# Patient Record
Sex: MALE | Race: WHITE | HISPANIC OR LATINO
[De-identification: names, ages, dates, MRNs, and addresses within clinical notes are randomized per-mention and may not be internally consistent; named-entity substitution may affect disease eponyms.]

---

## 2022-05-18 ENCOUNTER — APPOINTMENT (OUTPATIENT)
Dept: GASTROENTEROLOGY | Facility: CLINIC | Age: 62
End: 2022-05-18

## 2022-07-27 ENCOUNTER — NON-APPOINTMENT (OUTPATIENT)
Age: 62
End: 2022-07-27

## 2022-07-28 ENCOUNTER — RESULT CHARGE (OUTPATIENT)
Age: 62
End: 2022-07-28

## 2022-07-28 ENCOUNTER — APPOINTMENT (OUTPATIENT)
Dept: INTERNAL MEDICINE | Facility: CLINIC | Age: 62
End: 2022-07-28

## 2022-07-28 VITALS
HEIGHT: 67 IN | BODY MASS INDEX: 31.23 KG/M2 | WEIGHT: 199 LBS | HEART RATE: 51 BPM | TEMPERATURE: 98 F | SYSTOLIC BLOOD PRESSURE: 171 MMHG | OXYGEN SATURATION: 93 % | RESPIRATION RATE: 14 BRPM | DIASTOLIC BLOOD PRESSURE: 95 MMHG

## 2022-07-28 VITALS — SYSTOLIC BLOOD PRESSURE: 160 MMHG | DIASTOLIC BLOOD PRESSURE: 80 MMHG

## 2022-07-28 DIAGNOSIS — Z83.3 FAMILY HISTORY OF DIABETES MELLITUS: ICD-10-CM

## 2022-07-28 DIAGNOSIS — Z80.3 FAMILY HISTORY OF MALIGNANT NEOPLASM OF BREAST: ICD-10-CM

## 2022-07-28 DIAGNOSIS — R10.9 UNSPECIFIED ABDOMINAL PAIN: ICD-10-CM

## 2022-07-28 DIAGNOSIS — R39.15 URGENCY OF URINATION: ICD-10-CM

## 2022-07-28 DIAGNOSIS — Z80.0 FAMILY HISTORY OF MALIGNANT NEOPLASM OF DIGESTIVE ORGANS: ICD-10-CM

## 2022-07-28 DIAGNOSIS — S43.005A UNSPECIFIED DISLOCATION OF LEFT SHOULDER JOINT, INITIAL ENCOUNTER: ICD-10-CM

## 2022-07-28 DIAGNOSIS — Z82.49 FAMILY HISTORY OF ISCHEMIC HEART DISEASE AND OTHER DISEASES OF THE CIRCULATORY SYSTEM: ICD-10-CM

## 2022-07-28 DIAGNOSIS — Z78.9 OTHER SPECIFIED HEALTH STATUS: ICD-10-CM

## 2022-07-28 DIAGNOSIS — K57.90 DIVERTICULOSIS OF INTESTINE, PART UNSPECIFIED, W/OUT PERFORATION OR ABSCESS W/OUT BLEEDING: ICD-10-CM

## 2022-07-28 DIAGNOSIS — Z80.8 FAMILY HISTORY OF MALIGNANT NEOPLASM OF OTHER ORGANS OR SYSTEMS: ICD-10-CM

## 2022-07-28 DIAGNOSIS — Z87.39 PERSONAL HISTORY OF OTHER DISEASES OF THE MUSCULOSKELETAL SYSTEM AND CONNECTIVE TISSUE: ICD-10-CM

## 2022-07-28 LAB
BILIRUB UR QL STRIP: NEGATIVE
CLARITY UR: CLEAR
COLLECTION METHOD: NORMAL
GLUCOSE UR-MCNC: NEGATIVE
HCG UR QL: 0.2 EU/DL
HGB UR QL STRIP.AUTO: NORMAL
KETONES UR-MCNC: NEGATIVE
LEUKOCYTE ESTERASE UR QL STRIP: NEGATIVE
NITRITE UR QL STRIP: NEGATIVE
PH UR STRIP: 5.5
PROT UR STRIP-MCNC: NEGATIVE
SP GR UR STRIP: >1.03

## 2022-07-28 PROCEDURE — 99204 OFFICE O/P NEW MOD 45 MIN: CPT | Mod: GC,25

## 2022-07-28 PROCEDURE — 99386 PREV VISIT NEW AGE 40-64: CPT | Mod: 25

## 2022-07-28 PROCEDURE — 81002 URINALYSIS NONAUTO W/O SCOPE: CPT

## 2022-07-28 PROCEDURE — 36415 COLL VENOUS BLD VENIPUNCTURE: CPT

## 2022-07-28 PROCEDURE — G0444 DEPRESSION SCREEN ANNUAL: CPT | Mod: 59

## 2022-07-28 RX ORDER — BISOPROLOL FUMARATE AND HYDROCHLOROTHIAZIDE 5; 6.25 MG/1; MG/1
5-6.25 TABLET, FILM COATED ORAL DAILY
Refills: 0 | Status: DISCONTINUED | COMMUNITY
Start: 2022-07-28 | End: 2022-07-28

## 2022-07-28 RX ORDER — CHROMIUM 200 MCG
400 TABLET ORAL
Refills: 0 | Status: ACTIVE | COMMUNITY

## 2022-07-28 RX ORDER — ASCORBIC ACID 1000 MG
1000 TABLET, EXTENDED RELEASE ORAL
Refills: 0 | Status: ACTIVE | COMMUNITY

## 2022-07-29 ENCOUNTER — NON-APPOINTMENT (OUTPATIENT)
Age: 62
End: 2022-07-29

## 2022-07-29 LAB
ANION GAP SERPL CALC-SCNC: 15 MMOL/L
BUN SERPL-MCNC: 14 MG/DL
CALCIUM SERPL-MCNC: 10.2 MG/DL
CHLORIDE SERPL-SCNC: 105 MMOL/L
CHOLEST SERPL-MCNC: 244 MG/DL
CO2 SERPL-SCNC: 24 MMOL/L
CREAT SERPL-MCNC: 0.93 MG/DL
EGFR: 93 ML/MIN/1.73M2
GLUCOSE SERPL-MCNC: 114 MG/DL
HDLC SERPL-MCNC: 54 MG/DL
LDLC SERPL CALC-MCNC: 160 MG/DL
NONHDLC SERPL-MCNC: 190 MG/DL
POTASSIUM SERPL-SCNC: 4.4 MMOL/L
PSA SERPL-MCNC: 1.63 NG/ML
SODIUM SERPL-SCNC: 143 MMOL/L
TRIGL SERPL-MCNC: 150 MG/DL

## 2022-07-29 NOTE — ASSESSMENT
[FreeTextEntry1] : 60 y/o M with PMH HTN, diverticulosis, B/L rotator cuff tears s/p L shoulder arthroscopy (2018), umbilical hernia s/p repair presenting for annual physical with complaints of LLQ abdominal discomfort and urinary frequency.

## 2022-07-29 NOTE — PHYSICAL EXAM
[JVP Elevated ___cm] : the JVP was elevated [unfilled] ~Ucm [Distended] : distended [Scar] : a scar was noted [Epigastric] : in the epigastric area [LLQ] : in the left lower quadrant [No Mass] : no masses were palpated [No HSM] : no hepatosplenomegaly noted [None] : no CVA tenderness [Normal] : no rash [Periumbilical] : not periumbilical [Suprapubic] : not in the suprapubic area [RUQ] : not in the RUQ [RLQ] : not in the RLQ [LUQ] : not in the LUQ [Firm] : not firm [Rigid] : not rigid [Rebound] : no rebound [Guarding] : no guarding

## 2022-07-29 NOTE — REVIEW OF SYSTEMS
[Abdominal Pain] : abdominal pain [Frequency] : frequency [Negative] : Neurological [Nausea] : no nausea [Constipation] : no constipation [Diarrhea] : no diarrhea [Vomiting] : no vomiting [Heartburn] : no heartburn [Melena] : no melena [Dysuria] : no dysuria [Incontinence] : no incontinence [Hesitancy] : no hesitancy [Nocturia] : no nocturia [Hematuria] : no hematuria [Impotence] : no impotency [Poor Libido] : libido not poor

## 2022-07-29 NOTE — END OF VISIT
[] : Resident [FreeTextEntry3] : HTN: Repeat BP - 160/80.  DC BB+diuretic combo.  Start ACEi and Add terazosin. \par \par Urinary frequency - sx c/w BPH.  Check PSA.  DC diuretic. Can start a trial of terazosin for HTN and BPH. \par \par Chronic intermittent LLQ discomofort .  H/o abd surgery. No ssx obstruction.  Cscope normal recently.  GI referral.

## 2022-07-29 NOTE — HEALTH RISK ASSESSMENT
[Good] : ~his/her~  mood as  good [Never] : Never [Yes] : Yes [Monthly or less (1 pt)] : Monthly or less (1 point) [1 or 2 (0 pts)] : 1 or 2 (0 points) [Never (0 pts)] : Never (0 points) [No falls in past year] : Patient reported no falls in the past year [Patient refused screening] : Patient refused screening [Patient reported colonoscopy was abnormal] : Patient reported colonoscopy was abnormal [None] : None [With Significant Other] : lives with significant other [] :  [Sexually Active] : sexually active [Fully functional (bathing, dressing, toileting, transferring, walking, feeding)] : Fully functional (bathing, dressing, toileting, transferring, walking, feeding) [Fully functional (using the telephone, shopping, preparing meals, housekeeping, doing laundry, using] : Fully functional and needs no help or supervision to perform IADLs (using the telephone, shopping, preparing meals, housekeeping, doing laundry, using transportation, managing medications and managing finances) [0] : 2) Feeling down, depressed, or hopeless: Not at all (0) [PHQ-2 Negative - No further assessment needed] : PHQ-2 Negative - No further assessment needed [FreeTextEntry1] : frequent urination [de-identified] : active  [de-identified] : includes all food groups [[id number]] : 0 [High Risk Behavior] : no high risk behavior [Reports changes in hearing] : Reports no changes in hearing [Reports changes in vision] : Reports no changes in vision [Reports changes in dental health] : Reports no changes in dental health [ColonoscopyDate] : 04/2021 [ColonoscopyComments] : diverticulosis, hemorrhoid, and 1 polyp, return in 5 years

## 2022-07-29 NOTE — HISTORY OF PRESENT ILLNESS
[FreeTextEntry1] : Annual physical [de-identified] : 60 y/o M with PMH HTN, diverticulosis, B/L rotator cuff tears s/p L shoulder arthroscopy (2018), umbilical hernia s/p repair presenting for annual physical with complaints of LLQ abdominal discomfort for 4 days without associated N/V, heart burn, diarrhea. Pt reports intermittent bloating and gas for which he frequently takes pepcid for symptomatic relief. \par Pt also reports urinary frequency for several months in which he states that he gets the sudden urge to urinate and after urinating, has to urinate every 10-20 minutes. He denies any dysuria or hematuria, reports a normal stream and denies hesitance. He states that he awakes once a night to urinate

## 2022-07-29 NOTE — PLAN
[FreeTextEntry1] : #Abdominal discomfort\par Pt has a hx of diverticulosis, does not present with N/V, fever, chills, diarrhea so less concern for diverticulitis. Pain is improved with defecation, pt reports continued flatulence as well. Pt endorses pepcid as needed for dyspepsa, abdominal discomfort with improvement.\par - GI referral\par - Cont symptomatic management\par \par #Urinary frequency\par Pt denies hematuria, dysuria but complains of urinary frequency without nocturia and with normal stream without hesitancy.\par - F/U PSA\par - U/A negative\par - Start trial of terazosin 1mg and uptitrated to 2mg after 4 weeks, follow up in 6 weeks\par \par #HTN\par Pt has hx of HTN on bisoprolol-HCTZ. /91 and HR 51 on exam and 160/80 on repeat\par - D/C bisoprolol-HCTZ\par - Started lisinopril 10mg \par - F/U BMP\par - F/U in 6 weeks for BP check\par \par #HCM\par - COVID UTD\par - Dental and vision due\par - Colonoscopy due in 2026\par - Deferring additional vaccines at this time \par

## 2022-07-31 ENCOUNTER — EMERGENCY (EMERGENCY)
Facility: HOSPITAL | Age: 62
LOS: 1 days | Discharge: HOME | End: 2022-07-31
Attending: EMERGENCY MEDICINE | Admitting: EMERGENCY MEDICINE
Payer: MEDICAID

## 2022-07-31 ENCOUNTER — TRANSCRIPTION ENCOUNTER (OUTPATIENT)
Age: 62
End: 2022-07-31

## 2022-07-31 VITALS — DIASTOLIC BLOOD PRESSURE: 96 MMHG | HEART RATE: 62 BPM | SYSTOLIC BLOOD PRESSURE: 202 MMHG

## 2022-07-31 VITALS
TEMPERATURE: 98 F | WEIGHT: 195.11 LBS | OXYGEN SATURATION: 97 % | HEART RATE: 60 BPM | SYSTOLIC BLOOD PRESSURE: 232 MMHG | DIASTOLIC BLOOD PRESSURE: 123 MMHG | RESPIRATION RATE: 18 BRPM

## 2022-07-31 LAB
ALBUMIN SERPL ELPH-MCNC: 5 G/DL — SIGNIFICANT CHANGE UP (ref 3.3–5)
ALP SERPL-CCNC: 72 U/L — SIGNIFICANT CHANGE UP (ref 40–120)
ALT FLD-CCNC: 30 U/L — SIGNIFICANT CHANGE UP (ref 10–45)
ANION GAP SERPL CALC-SCNC: 9 MMOL/L — SIGNIFICANT CHANGE UP (ref 5–17)
APTT BLD: 32.6 SEC — SIGNIFICANT CHANGE UP (ref 27.5–35.5)
AST SERPL-CCNC: 22 U/L — SIGNIFICANT CHANGE UP (ref 10–40)
BASOPHILS # BLD AUTO: 0.03 K/UL — SIGNIFICANT CHANGE UP (ref 0–0.2)
BASOPHILS NFR BLD AUTO: 0.5 % — SIGNIFICANT CHANGE UP (ref 0–2)
BILIRUB SERPL-MCNC: 0.4 MG/DL — SIGNIFICANT CHANGE UP (ref 0.2–1.2)
BUN SERPL-MCNC: 15 MG/DL — SIGNIFICANT CHANGE UP (ref 7–23)
CALCIUM SERPL-MCNC: 9.7 MG/DL — SIGNIFICANT CHANGE UP (ref 8.4–10.5)
CHLORIDE SERPL-SCNC: 105 MMOL/L — SIGNIFICANT CHANGE UP (ref 96–108)
CO2 SERPL-SCNC: 27 MMOL/L — SIGNIFICANT CHANGE UP (ref 22–31)
CREAT SERPL-MCNC: 0.86 MG/DL — SIGNIFICANT CHANGE UP (ref 0.5–1.3)
EGFR: 99 ML/MIN/1.73M2 — SIGNIFICANT CHANGE UP
EOSINOPHIL # BLD AUTO: 0.08 K/UL — SIGNIFICANT CHANGE UP (ref 0–0.5)
EOSINOPHIL NFR BLD AUTO: 1.3 % — SIGNIFICANT CHANGE UP (ref 0–6)
GLUCOSE SERPL-MCNC: 113 MG/DL — HIGH (ref 70–99)
HCT VFR BLD CALC: 44.5 % — SIGNIFICANT CHANGE UP (ref 39–50)
HGB BLD-MCNC: 15.5 G/DL — SIGNIFICANT CHANGE UP (ref 13–17)
IMM GRANULOCYTES NFR BLD AUTO: 0.5 % — SIGNIFICANT CHANGE UP (ref 0–1.5)
INR BLD: 0.97 — SIGNIFICANT CHANGE UP (ref 0.88–1.16)
LYMPHOCYTES # BLD AUTO: 1.69 K/UL — SIGNIFICANT CHANGE UP (ref 1–3.3)
LYMPHOCYTES # BLD AUTO: 28 % — SIGNIFICANT CHANGE UP (ref 13–44)
MCHC RBC-ENTMCNC: 29.5 PG — SIGNIFICANT CHANGE UP (ref 27–34)
MCHC RBC-ENTMCNC: 34.8 GM/DL — SIGNIFICANT CHANGE UP (ref 32–36)
MCV RBC AUTO: 84.6 FL — SIGNIFICANT CHANGE UP (ref 80–100)
MONOCYTES # BLD AUTO: 0.35 K/UL — SIGNIFICANT CHANGE UP (ref 0–0.9)
MONOCYTES NFR BLD AUTO: 5.8 % — SIGNIFICANT CHANGE UP (ref 2–14)
NEUTROPHILS # BLD AUTO: 3.85 K/UL — SIGNIFICANT CHANGE UP (ref 1.8–7.4)
NEUTROPHILS NFR BLD AUTO: 63.9 % — SIGNIFICANT CHANGE UP (ref 43–77)
NRBC # BLD: 0 /100 WBCS — SIGNIFICANT CHANGE UP (ref 0–0)
PLATELET # BLD AUTO: 215 K/UL — SIGNIFICANT CHANGE UP (ref 150–400)
POTASSIUM SERPL-MCNC: 4 MMOL/L — SIGNIFICANT CHANGE UP (ref 3.5–5.3)
POTASSIUM SERPL-SCNC: 4 MMOL/L — SIGNIFICANT CHANGE UP (ref 3.5–5.3)
PROT SERPL-MCNC: 7 G/DL — SIGNIFICANT CHANGE UP (ref 6–8.3)
PROTHROM AB SERPL-ACNC: 11.5 SEC — SIGNIFICANT CHANGE UP (ref 10.5–13.4)
RBC # BLD: 5.26 M/UL — SIGNIFICANT CHANGE UP (ref 4.2–5.8)
RBC # FLD: 12.4 % — SIGNIFICANT CHANGE UP (ref 10.3–14.5)
SARS-COV-2 RNA SPEC QL NAA+PROBE: NEGATIVE — SIGNIFICANT CHANGE UP
SODIUM SERPL-SCNC: 141 MMOL/L — SIGNIFICANT CHANGE UP (ref 135–145)
WBC # BLD: 6.03 K/UL — SIGNIFICANT CHANGE UP (ref 3.8–10.5)
WBC # FLD AUTO: 6.03 K/UL — SIGNIFICANT CHANGE UP (ref 3.8–10.5)

## 2022-07-31 PROCEDURE — 85610 PROTHROMBIN TIME: CPT

## 2022-07-31 PROCEDURE — 87635 SARS-COV-2 COVID-19 AMP PRB: CPT

## 2022-07-31 PROCEDURE — 80053 COMPREHEN METABOLIC PANEL: CPT

## 2022-07-31 PROCEDURE — 70450 CT HEAD/BRAIN W/O DYE: CPT | Mod: 26,MA

## 2022-07-31 PROCEDURE — 96374 THER/PROPH/DIAG INJ IV PUSH: CPT

## 2022-07-31 PROCEDURE — 99285 EMERGENCY DEPT VISIT HI MDM: CPT | Mod: 25

## 2022-07-31 PROCEDURE — 85730 THROMBOPLASTIN TIME PARTIAL: CPT

## 2022-07-31 PROCEDURE — 70450 CT HEAD/BRAIN W/O DYE: CPT | Mod: MA

## 2022-07-31 PROCEDURE — 85025 COMPLETE CBC W/AUTO DIFF WBC: CPT

## 2022-07-31 PROCEDURE — 82962 GLUCOSE BLOOD TEST: CPT

## 2022-07-31 PROCEDURE — 96375 TX/PRO/DX INJ NEW DRUG ADDON: CPT

## 2022-07-31 PROCEDURE — 99285 EMERGENCY DEPT VISIT HI MDM: CPT

## 2022-07-31 PROCEDURE — 93005 ELECTROCARDIOGRAM TRACING: CPT

## 2022-07-31 PROCEDURE — 36415 COLL VENOUS BLD VENIPUNCTURE: CPT

## 2022-07-31 RX ORDER — METOCLOPRAMIDE HCL 10 MG
10 TABLET ORAL ONCE
Refills: 0 | Status: COMPLETED | OUTPATIENT
Start: 2022-07-31 | End: 2022-07-31

## 2022-07-31 RX ORDER — ACETAMINOPHEN 500 MG
650 TABLET ORAL ONCE
Refills: 0 | Status: COMPLETED | OUTPATIENT
Start: 2022-07-31 | End: 2022-07-31

## 2022-07-31 RX ORDER — KETOROLAC TROMETHAMINE 30 MG/ML
15 SYRINGE (ML) INJECTION ONCE
Refills: 0 | Status: DISCONTINUED | OUTPATIENT
Start: 2022-07-31 | End: 2022-07-31

## 2022-07-31 RX ORDER — SODIUM CHLORIDE 9 MG/ML
1000 INJECTION INTRAMUSCULAR; INTRAVENOUS; SUBCUTANEOUS ONCE
Refills: 0 | Status: COMPLETED | OUTPATIENT
Start: 2022-07-31 | End: 2022-07-31

## 2022-07-31 RX ORDER — LISINOPRIL 2.5 MG/1
5 TABLET ORAL ONCE
Refills: 0 | Status: COMPLETED | OUTPATIENT
Start: 2022-07-31 | End: 2022-07-31

## 2022-07-31 RX ADMIN — Medication 650 MILLIGRAM(S): at 18:17

## 2022-07-31 RX ADMIN — Medication 650 MILLIGRAM(S): at 17:43

## 2022-07-31 RX ADMIN — Medication 15 MILLIGRAM(S): at 18:18

## 2022-07-31 RX ADMIN — Medication 104 MILLIGRAM(S): at 17:43

## 2022-07-31 RX ADMIN — SODIUM CHLORIDE 1000 MILLILITER(S): 9 INJECTION INTRAMUSCULAR; INTRAVENOUS; SUBCUTANEOUS at 17:44

## 2022-07-31 RX ADMIN — LISINOPRIL 5 MILLIGRAM(S): 2.5 TABLET ORAL at 17:55

## 2022-07-31 RX ADMIN — Medication 15 MILLIGRAM(S): at 18:09

## 2022-07-31 NOTE — ED PROVIDER NOTE - PROGRESS NOTE DETAILS
[place]: Labs grossly wnl. COVID neg. CTH w/ no acute pathology. Vitals improved  -still hypertensive ~200/90s but ~20% reduction from initial 250/120s. Pt is completely asymptomatic. Pt has asymptoamtic HTN. d/w pt that normal CTH does not r/o ICU but that clinically there is low suspicion. I d/w pt and wife/child at bedside risks/benefits of LP, pt agrees that risks likely outweigh benefits at this tie. Clinically asymptomatic HTN. will dc on both lisinopril and bisoprolol.   Discussed importance of outpt follow up and return precautions. Clinically no indication for further emergent ED workup or hospitalization at this time. Stable for dc, outpt f/u.

## 2022-07-31 NOTE — ED PROVIDER NOTE - PHYSICAL EXAMINATION
no nuchal rigidity  PERRL, EOMI, no nystagmus. CN intact. Strength 5/5. Normal F-->N. Steady unassisted gait. No pronator drift. Sensation intact. Normal speech, no dysarthria. No carotid bruits. Negative Romberg, normal tandem gait.

## 2022-07-31 NOTE — ED PROVIDER NOTE - NSFOLLOWUPINSTRUCTIONS_ED_ALL_ED_FT
Can take tylenol 650mg every 6hrs as needed for mild pain.    Stay well hydrated.    Return to ER for fevers, persistent vomit, chest pain, worsening headache, worsening confusion, uncontrolled pain, worsening breathing, worsening lightheaded, focal weakness/numbness, worsening vision changes.    Follow up with primary doctor within 1-2 days.     Follow up with neurologist. Can call [phone] to schedule appointment.     Measure your blood pressure once or twice a day.   Continue to take medications as prescribed. Your doctor may need to make medication changes if your blood pressure continues to be high. Take your usual bisoprolol/HCTZ in the mornings, take lisinopril 5mg at night.     Hypertension    Hypertension, commonly called high blood pressure, is when the force of blood pumping through your arteries is too strong. Hypertension forces your heart to work harder to pump blood. Your arteries may become narrow or stiff. Having untreated or uncontrolled hypertension for a long period of time can cause heart attack, stroke, kidney disease, and other problems. If started on a medication, take exactly as prescribed by your health care professional. Maintain a healthy lifestyle and follow up with your primary care physician.    SEEK IMMEDIATE MEDICAL CARE IF YOU HAVE ANY OF THE FOLLOWING SYMPTOMS: severe headache, confusion, chest pain, abdominal pain, vomiting, or shortness of breath.     A headache is pain or discomfort felt around the head or neck area. The specific cause of a headache may not be found. There are many causes and types of headaches. A few common ones are:  Tension headaches.  Migraine headaches.  Cluster headaches.  Chronic daily headaches.  Follow these instructions at home:  Watch your condition for any changes.     Take these steps to help with your condition:  Managing pain   Take over-the-counter and prescription medicines only as told by your health care provider.  Lie down in a dark, quiet room when you have a headache.  If directed, apply ice to the head and neck area:  Put ice in a plastic bag.  Place a towel between your skin and the bag.  Leave the ice on for 20 minutes, 2–3 times per day.  Use a heating pad or hot shower to apply heat to the head and neck area as told by your health care provider.  Keep lights dim if bright lights bother you or make your headaches worse.    Eating and drinking   Eat meals on a regular schedule.  Limit alcohol use.  Decrease the amount of caffeine you drink, or stop drinking caffeine.    General instructions      Keep all follow-up visits as told by your health care provider. This is important.  Keep a headache journal to help find out what may trigger your headaches. For example, write down:  What you eat and drink.  How much sleep you get.  Any change to your diet or medicines.  Try massage or other relaxation techniques.  Limit stress.  Sit up straight, and do not tense your muscles.  Do not use tobacco products, including cigarettes, chewing tobacco, or e-cigarettes. If you need help quitting, ask your health care provider.  Exercise regularly as told by your health care provider.  Sleep on a regular schedule. Get 7–9 hours of sleep, or the amount recommended by your health care provider.    Contact a health care provider if:  Your symptoms are not helped by medicine.  You have a headache that is different from the usual headache.  You have nausea or you vomit.  You have a fever.  Get help right away if:  Your headache becomes severe.  You have repeated vomiting.  You have a stiff neck.  You have a loss of vision.  You have problems with speech.  You have pain in the eye or ear.  You have muscular weakness or loss of muscle control.  You lose your balance or have trouble walking.  You feel faint or pass out.  You have confusion.

## 2022-07-31 NOTE — ED ADULT TRIAGE NOTE - CHIEF COMPLAINT QUOTE
Pt c/o h/a and HTN x one day. Endorses hx of HTN, compliant with medications. Pt denies numbness/tingling, unilateral weakness.

## 2022-07-31 NOTE — ED PROVIDER NOTE - PATIENT PORTAL LINK FT
You can access the FollowMyHealth Patient Portal offered by [facility] by registering at the following website: [email or web address]. By joining [employer]’s FollowMyHealth portal, you will also be able to view your health information using other applications (apps) compatible with our system.

## 2022-07-31 NOTE — ED ADULT NURSE NOTE - OBJECTIVE STATEMENT
Pt presented to er with c/o ha since yesterday. P endorses temporal headache that radiated to neck region. Pt also endorses high blood pressure n 200s. Pt upgraded to . Pt denies cp, sob, no numbness/tingling. Pt A&Ox4, ambulatory with steady gait, speaking in clear/full sentences, no acute distress, vital signs stable.

## 2022-07-31 NOTE — ED PROVIDER NOTE - OBJECTIVE STATEMENT
61M PMH HTN, HLD, p/w HTN. Pt states that he went to PMD 2d ago and was noted to be hypertensive 170s/100s. Pt used to take bisoprolol/HCTZ (10/6.5) once daily. PMD stopped that and started lisinopril 10mg which he took that day. However, BP decreased to 130s and pt was feeling slightly lightheaded and so pt states that PMD recommended decreasing to 5mg which he took yesterday. Today was still very hypertensive (180s-190s) and so he did not take lisinopril and instead took his bisoprolol/HCTZ. Remained hypertensive and so came to ED. Pt also notes bitemporal HA as well as pain to b/l posterior neck - gradual onset since yesterday, similar location to multiple prior but different quality (feels "pressure" like). No other systemic symptoms.   Drinks etoh on weekends, denies urge to drink etoh during the week or other withdrawal symptoms.   Denies vision changes, focal weakness/numbness, vertigo, rashes, tinnitus, hearing changes, URI symptoms, f/c, SOB/CP, NVD, abd pain, urinary complaints, black/bloody stool, lifestyle/dietary changes.

## 2022-07-31 NOTE — ED PROVIDER NOTE - CLINICAL SUMMARY MEDICAL DECISION MAKING FREE TEXT BOX
61M PMH HTN, HLD, p/w HTN. Pt states that he went to PMD 2d ago and was noted to be hypertensive 170s/100s. Pt used to take bisoprolol/HCTZ (10/6.5) once daily. PMD stopped that and started lisinopril 10mg which he took that day. However, BP decreased to 130s and pt was feeling slightly lightheaded and so pt states that PMD recommended decreasing to 5mg which he took yesterday. Today was still very hypertensive (180s-190s) and so he did not take lisinopril and instead took his bisoprolol/HCTZ. Remained hypertensive and so came to ED. Pt also notes bitemporal HA as well as pain to b/l posterior neck - gradual onset since yesterday, similar location to multiple prior but different quality (feels "pressure" like). No other systemic symptoms.   Hypertensive, other vitals wnl. Exam as above.  ddx: HTN likely 2/2 HA as well as medication adjustments on top of baseline HTN. Low suspicion for ICH/end organ manifestation of HTN.   CTH, IVF/symptom control, reassess.

## 2022-08-03 ENCOUNTER — APPOINTMENT (OUTPATIENT)
Dept: INTERNAL MEDICINE | Facility: CLINIC | Age: 62
End: 2022-08-03

## 2022-08-03 VITALS
HEIGHT: 67 IN | WEIGHT: 190 LBS | SYSTOLIC BLOOD PRESSURE: 175 MMHG | HEART RATE: 54 BPM | DIASTOLIC BLOOD PRESSURE: 95 MMHG | TEMPERATURE: 98.2 F | OXYGEN SATURATION: 99 % | BODY MASS INDEX: 29.82 KG/M2

## 2022-08-03 DIAGNOSIS — Z20.822 CONTACT WITH AND (SUSPECTED) EXPOSURE TO COVID-19: ICD-10-CM

## 2022-08-03 DIAGNOSIS — I10 ESSENTIAL (PRIMARY) HYPERTENSION: ICD-10-CM

## 2022-08-03 DIAGNOSIS — R42 DIZZINESS AND GIDDINESS: ICD-10-CM

## 2022-08-03 DIAGNOSIS — R51.9 HEADACHE, UNSPECIFIED: ICD-10-CM

## 2022-08-03 DIAGNOSIS — R00.1 BRADYCARDIA, UNSPECIFIED: ICD-10-CM

## 2022-08-03 DIAGNOSIS — E78.5 HYPERLIPIDEMIA, UNSPECIFIED: ICD-10-CM

## 2022-08-03 PROCEDURE — 99213 OFFICE O/P EST LOW 20 MIN: CPT | Mod: GC

## 2022-08-08 ENCOUNTER — APPOINTMENT (OUTPATIENT)
Dept: INTERNAL MEDICINE | Facility: CLINIC | Age: 62
End: 2022-08-08

## 2022-08-17 ENCOUNTER — APPOINTMENT (OUTPATIENT)
Dept: INTERNAL MEDICINE | Facility: CLINIC | Age: 62
End: 2022-08-17

## 2022-08-17 VITALS
WEIGHT: 187 LBS | HEART RATE: 70 BPM | OXYGEN SATURATION: 99 % | RESPIRATION RATE: 14 BRPM | DIASTOLIC BLOOD PRESSURE: 99 MMHG | HEIGHT: 67 IN | BODY MASS INDEX: 29.35 KG/M2 | SYSTOLIC BLOOD PRESSURE: 168 MMHG

## 2022-08-17 DIAGNOSIS — Z01.00 ENCOUNTER FOR EXAMINATION OF EYES AND VISION W/OUT ABNORMAL FINDINGS: ICD-10-CM

## 2022-08-17 PROCEDURE — 99213 OFFICE O/P EST LOW 20 MIN: CPT | Mod: GC

## 2022-08-17 RX ORDER — HYDROCHLOROTHIAZIDE 12.5 MG/1
12.5 CAPSULE ORAL DAILY
Qty: 30 | Refills: 0 | Status: DISCONTINUED | COMMUNITY
Start: 2022-08-03 | End: 2022-08-17

## 2022-08-17 RX ORDER — LISINOPRIL 10 MG/1
10 TABLET ORAL DAILY
Qty: 1 | Refills: 1 | Status: DISCONTINUED | COMMUNITY
Start: 2022-07-28 | End: 2022-08-17

## 2022-08-17 RX ORDER — TERAZOSIN 1 MG/1
1 CAPSULE ORAL
Qty: 30 | Refills: 1 | Status: DISCONTINUED | COMMUNITY
Start: 2022-07-28 | End: 2022-08-17

## 2022-08-17 NOTE — ASSESSMENT
[FreeTextEntry1] : 60 y/o man with PMH HTN, HLD, diverticulosis, B/L rotator cuff tears s/p L shoulder arthroscopy (2018), umbilical hernia s/p repair presenting for blood pressure medication optimization follow up. \par \par #HTN \par BP still elevated 150s-160s, even at home. \par - Increase HCTZ to 25mg once a day. Patient prefers to wait and see new BP before adding another medication (amlodipine) \par - F/u in 2 weeks for BP check and BMP \par \par #HLD\par LDL >150 on lipid profile from July. Not on statin. \par - Patient actively trying to lose weight and diet, would prefer to recheck lipids later and start medication at that time if it does not improve \par \par #HCM \par - UTD on COVId including booster - 2nd booster due in fall \par - Deferring pneumonia, shingles, etc at this time \par - Can check A1C at next visit with labs \par \par RTC in 2 weeks for BP check and BMP

## 2022-08-17 NOTE — END OF VISIT
[] : Resident [FreeTextEntry3] :  61 year old M presenting for BP f/u , accompanied by his wife. Tolerating HCTZ 12.5mg well, BP remains elevated. Will increase HCTZ to 25mg, pt declines additional BP agent for now, is pursuing healthy diet and exercise. Discussed elevated LDL, ASCVD risk and recommended statin which he also defers for now, will readdress at a future visit.  RTC 2 weeks for BP check and labs.

## 2022-08-17 NOTE — HISTORY OF PRESENT ILLNESS
[Spouse] : spouse [FreeTextEntry1] : here for blood pressure check  [de-identified] : 60 y/o man with PMH HTN, diverticulosis, B/L rotator cuff tears s/p L shoulder arthroscopy (2018), umbilical hernia s/p repair presenting for blood pressure medication optimization follow up. He was last seen 2 weeks prior, where we stopped his lisinopril and started HCTZ 12.5 once a day. Blood pressure log at home as been 150s-160s/90s. He denies any blurry vision, chest pain, dizziness, excessive urination. He is actively trying to lose weight (has lost 15 lbs intentionally over the last 1.5 months). He feels well otherwise and has no acute complaints. \par

## 2022-08-23 ENCOUNTER — RX RENEWAL (OUTPATIENT)
Age: 62
End: 2022-08-23

## 2022-08-26 ENCOUNTER — NON-APPOINTMENT (OUTPATIENT)
Age: 62
End: 2022-08-26

## 2022-09-08 ENCOUNTER — APPOINTMENT (OUTPATIENT)
Dept: INTERNAL MEDICINE | Facility: CLINIC | Age: 62
End: 2022-09-08

## 2022-09-08 VITALS
HEIGHT: 67 IN | SYSTOLIC BLOOD PRESSURE: 144 MMHG | DIASTOLIC BLOOD PRESSURE: 88 MMHG | TEMPERATURE: 97.7 F | WEIGHT: 187 LBS | HEART RATE: 60 BPM | OXYGEN SATURATION: 95 % | BODY MASS INDEX: 29.35 KG/M2 | RESPIRATION RATE: 14 BRPM

## 2022-09-08 PROCEDURE — 99214 OFFICE O/P EST MOD 30 MIN: CPT

## 2022-09-08 NOTE — END OF VISIT
[FreeTextEntry3] : 60 yo male with hx HTN here for f/u BP after stopping ace and increasing hctz to 25mg\par \par 1) HTN - brought log from home, ranging 120s-130s with occasional 140.  counseled continue with lifestyle modifications. f/u 3 months, if elevated add amlodipine.\par 2) HPL - reviewed labs, statin recommended but he declines at this time.  c/w lifestyle modifications, f/u 3 months\par

## 2022-09-08 NOTE — HISTORY OF PRESENT ILLNESS
[FreeTextEntry1] : f/u- BP check [de-identified] : 60 y/o man with PMH HTN, diverticulosis, B/L rotator cuff tears s/p L shoulder arthroscopy (2018), umbilical hernia s/p repair presenting for BP follow-up. Patient was last seen in clinic on 08/17/22, during which time his home HCTZ was increased from 12.5 to 25mg. Home BP has ranged 130-140s/80s, improved from 150-160s prior to the increased dose. He has been exercising more and has lost 12 lbs (same as last visit). Patient denies changes in vision, headaches, chest pain, acute sob, abdominal pain, urinary symptoms, leg pain, neuropathy, or tingling.

## 2022-09-08 NOTE — ASSESSMENT
[FreeTextEntry1] : 62 y/o man with PMH HTN, HLD, diverticulosis, B/L rotator cuff tears s/p L shoulder arthroscopy (2018), umbilical hernia s/p repair presenting for blood pressure follow up.\par \par #HTN \par Home improved to 130-140/80s after increasing to HCTZ 25mg qd last visit\par - c/w HCTZ 25mg qd\par - Obtain BMP at next visit \par - Will consider starting Amlodipine 5mg at next visit if BP remains elevated\par \par #HLD\par ASCVD 19%. Cholesterol 240 and LDL >150 on lipid profile from 06/22\par - Patient would like to continue to make lifestyle modifications prior to initiating statin therapy. \par - Consider lipid panel at next visit\par \par RTC in 3 months for BP check and repeat Lipid\par

## 2022-09-13 ENCOUNTER — RX RENEWAL (OUTPATIENT)
Age: 62
End: 2022-09-13

## 2022-09-22 ENCOUNTER — APPOINTMENT (OUTPATIENT)
Dept: INTERNAL MEDICINE | Facility: CLINIC | Age: 62
End: 2022-09-22

## 2022-09-27 ENCOUNTER — APPOINTMENT (OUTPATIENT)
Dept: OPHTHALMOLOGY | Facility: CLINIC | Age: 62
End: 2022-09-27

## 2022-12-16 ENCOUNTER — APPOINTMENT (OUTPATIENT)
Dept: INTERNAL MEDICINE | Facility: CLINIC | Age: 62
End: 2022-12-16

## 2022-12-16 VITALS
DIASTOLIC BLOOD PRESSURE: 80 MMHG | SYSTOLIC BLOOD PRESSURE: 140 MMHG | TEMPERATURE: 98 F | WEIGHT: 187 LBS | OXYGEN SATURATION: 95 % | HEART RATE: 80 BPM | BODY MASS INDEX: 29.29 KG/M2

## 2022-12-16 PROCEDURE — 99214 OFFICE O/P EST MOD 30 MIN: CPT

## 2023-03-16 ENCOUNTER — APPOINTMENT (OUTPATIENT)
Dept: INTERNAL MEDICINE | Facility: CLINIC | Age: 63
End: 2023-03-16
Payer: MEDICAID

## 2023-03-16 ENCOUNTER — LABORATORY RESULT (OUTPATIENT)
Age: 63
End: 2023-03-16

## 2023-03-16 VITALS
OXYGEN SATURATION: 95 % | HEART RATE: 68 BPM | SYSTOLIC BLOOD PRESSURE: 152 MMHG | RESPIRATION RATE: 14 BRPM | WEIGHT: 181 LBS | DIASTOLIC BLOOD PRESSURE: 95 MMHG | BODY MASS INDEX: 28.35 KG/M2

## 2023-03-16 DIAGNOSIS — Z11.59 ENCOUNTER FOR SCREENING FOR OTHER VIRAL DISEASES: ICD-10-CM

## 2023-03-16 DIAGNOSIS — Z11.4 ENCOUNTER FOR SCREENING FOR HUMAN IMMUNODEFICIENCY VIRUS [HIV]: ICD-10-CM

## 2023-03-16 DIAGNOSIS — R59.9 ENLARGED LYMPH NODES, UNSPECIFIED: ICD-10-CM

## 2023-03-16 DIAGNOSIS — R73.09 OTHER ABNORMAL GLUCOSE: ICD-10-CM

## 2023-03-16 DIAGNOSIS — I16.0 HYPERTENSIVE URGENCY: ICD-10-CM

## 2023-03-16 DIAGNOSIS — I10 ESSENTIAL (PRIMARY) HYPERTENSION: ICD-10-CM

## 2023-03-16 PROCEDURE — 99396 PREV VISIT EST AGE 40-64: CPT | Mod: GC,25

## 2023-03-17 LAB
CHOLEST SERPL-MCNC: 214 MG/DL
ESTIMATED AVERAGE GLUCOSE: 114 MG/DL
HBA1C MFR BLD HPLC: 5.6 %
HCV AB SER QL: NONREACTIVE
HCV S/CO RATIO: 0.07 S/CO
HDLC SERPL-MCNC: 53 MG/DL
HIV1+2 AB SPEC QL IA.RAPID: NONREACTIVE
LDLC SERPL CALC-MCNC: 133 MG/DL
NONHDLC SERPL-MCNC: 161 MG/DL
TRIGL SERPL-MCNC: 138 MG/DL

## 2023-03-28 ENCOUNTER — APPOINTMENT (OUTPATIENT)
Dept: INTERNAL MEDICINE | Facility: CLINIC | Age: 63
End: 2023-03-28

## 2023-04-06 ENCOUNTER — APPOINTMENT (OUTPATIENT)
Dept: INTERNAL MEDICINE | Facility: CLINIC | Age: 63
End: 2023-04-06
Payer: MEDICAID

## 2023-04-06 VITALS
TEMPERATURE: 97 F | DIASTOLIC BLOOD PRESSURE: 94 MMHG | WEIGHT: 177 LBS | HEART RATE: 65 BPM | BODY MASS INDEX: 27.78 KG/M2 | SYSTOLIC BLOOD PRESSURE: 143 MMHG | OXYGEN SATURATION: 94 % | HEIGHT: 67 IN

## 2023-04-06 PROCEDURE — 99214 OFFICE O/P EST MOD 30 MIN: CPT | Mod: GC

## 2023-05-11 ENCOUNTER — APPOINTMENT (OUTPATIENT)
Dept: INTERNAL MEDICINE | Facility: CLINIC | Age: 63
End: 2023-05-11
Payer: MEDICAID

## 2023-05-11 VITALS
OXYGEN SATURATION: 96 % | HEIGHT: 67 IN | HEART RATE: 77 BPM | RESPIRATION RATE: 14 BRPM | TEMPERATURE: 97.1 F | DIASTOLIC BLOOD PRESSURE: 85 MMHG | SYSTOLIC BLOOD PRESSURE: 145 MMHG

## 2023-05-11 PROCEDURE — 99213 OFFICE O/P EST LOW 20 MIN: CPT | Mod: GC

## 2023-05-11 NOTE — PLAN
[FreeTextEntry1] : #HTN\par PT reports home monitoring in the range of 120s/80s, however /85 today in office. Pt compliant with HCTZ 25mg and amlodipine 5mg (did not continue 10mg due to LE edema). No alarm symptoms in office today\par - Refilled medications-- cont amlodipine 5mg and HCTZ 25mg\par - Pt to bring home monitor to compare readings at next visit\par - RTC in 3 months\par \par #HLD\par Pt would like to defer statin use due to previous reactions

## 2023-05-11 NOTE — PHYSICAL EXAM
[Distended] : distended [Soft, Nontender] : the abdomen was soft and nontender [Normal] : no joint swelling and grossly normal strength and tone

## 2023-05-11 NOTE — END OF VISIT
[FreeTextEntry3] : 63 yo female with hx HTN here for BP check\par \par 1) HTN - chronic, above goal today however at home consistently 120s/80s.  \par self decreased amlodipine 10mg to 5mg due to leg swelling, now resolved\par counseled f/u within 3 months and bring home cuff to confirm accuracy

## 2023-05-11 NOTE — HISTORY OF PRESENT ILLNESS
[FreeTextEntry1] : BP f/u [de-identified] : 61 y/o M w/ PMH of HTN, HLD presents for BP f/u. Pt was seen last month and amlodipine 5mg dose was increased to 10mg with HCTZ 25mg to be continued. Pt returns today with no complaints acutely but reports discontinuation of amlodipine 10mg and resuming 5mg due to b/l lower extremity swelling and skin changes on R foot.

## 2023-06-26 ENCOUNTER — APPOINTMENT (OUTPATIENT)
Dept: INTERNAL MEDICINE | Facility: CLINIC | Age: 63
End: 2023-06-26
Payer: COMMERCIAL

## 2023-06-26 VITALS
WEIGHT: 177 LBS | HEART RATE: 63 BPM | TEMPERATURE: 97.1 F | BODY MASS INDEX: 27.78 KG/M2 | DIASTOLIC BLOOD PRESSURE: 87 MMHG | SYSTOLIC BLOOD PRESSURE: 135 MMHG | HEIGHT: 67 IN | OXYGEN SATURATION: 95 %

## 2023-06-26 PROCEDURE — 99213 OFFICE O/P EST LOW 20 MIN: CPT | Mod: GC

## 2023-06-26 RX ORDER — FLUTICASONE PROPIONATE 50 UG/1
50 SPRAY, METERED NASAL TWICE DAILY
Qty: 1 | Refills: 0 | Status: ACTIVE | COMMUNITY
Start: 2023-06-26 | End: 1900-01-01

## 2023-06-26 RX ORDER — AMLODIPINE BESYLATE 10 MG/1
10 TABLET ORAL DAILY
Qty: 90 | Refills: 1 | Status: DISCONTINUED | COMMUNITY
Start: 2023-04-06 | End: 2023-06-26

## 2023-08-08 ENCOUNTER — RX RENEWAL (OUTPATIENT)
Age: 63
End: 2023-08-08

## 2023-08-10 ENCOUNTER — APPOINTMENT (OUTPATIENT)
Dept: INTERNAL MEDICINE | Facility: CLINIC | Age: 63
End: 2023-08-10

## 2023-11-07 ENCOUNTER — RX RENEWAL (OUTPATIENT)
Age: 63
End: 2023-11-07

## 2023-11-28 ENCOUNTER — RX RENEWAL (OUTPATIENT)
Age: 63
End: 2023-11-28

## 2023-12-04 ENCOUNTER — RX RENEWAL (OUTPATIENT)
Age: 63
End: 2023-12-04

## 2024-03-03 ENCOUNTER — RX RENEWAL (OUTPATIENT)
Age: 64
End: 2024-03-03

## 2024-04-03 ENCOUNTER — APPOINTMENT (OUTPATIENT)
Dept: INTERNAL MEDICINE | Facility: CLINIC | Age: 64
End: 2024-04-03
Payer: COMMERCIAL

## 2024-04-03 VITALS
SYSTOLIC BLOOD PRESSURE: 135 MMHG | OXYGEN SATURATION: 94 % | HEIGHT: 67 IN | BODY MASS INDEX: 27.47 KG/M2 | DIASTOLIC BLOOD PRESSURE: 86 MMHG | HEART RATE: 66 BPM | WEIGHT: 175 LBS | TEMPERATURE: 98.2 F

## 2024-04-03 DIAGNOSIS — Z00.00 ENCOUNTER FOR GENERAL ADULT MEDICAL EXAMINATION W/OUT ABNORMAL FINDINGS: ICD-10-CM

## 2024-04-03 DIAGNOSIS — I10 ESSENTIAL (PRIMARY) HYPERTENSION: ICD-10-CM

## 2024-04-03 DIAGNOSIS — Z12.5 ENCOUNTER FOR SCREENING FOR MALIGNANT NEOPLASM OF PROSTATE: ICD-10-CM

## 2024-04-03 DIAGNOSIS — B97.89 CHRONIC SINUSITIS, UNSPECIFIED: ICD-10-CM

## 2024-04-03 DIAGNOSIS — E78.5 HYPERLIPIDEMIA, UNSPECIFIED: ICD-10-CM

## 2024-04-03 DIAGNOSIS — J32.9 CHRONIC SINUSITIS, UNSPECIFIED: ICD-10-CM

## 2024-04-03 PROCEDURE — 99396 PREV VISIT EST AGE 40-64: CPT | Mod: 25

## 2024-04-03 PROCEDURE — 36415 COLL VENOUS BLD VENIPUNCTURE: CPT

## 2024-04-03 NOTE — HISTORY OF PRESENT ILLNESS
[FreeTextEntry1] : cpe visit and has a form to be filled out for his insurance.    [de-identified] : 63M PMH of HTN, HLD presents for CPE. Reports adherence with medications. Feels well, no complaints. Wife present in the room for visit. Reports losing weight due to diet and exercise.

## 2024-04-03 NOTE — END OF VISIT
[] : Resident [FreeTextEntry3] : #HCM- no tobacco. 1-2 drinks of wine a day. get us old colonoscopy report- per him had polyps in 2022 and needs to repeat in 5 years . should try to increase exercise. see optho. check labs.

## 2024-04-03 NOTE — ASSESSMENT
[FreeTextEntry1] : #HCM - shingles and Tdap declined, counseled it is my strong recommendation for him to get these vaccinations - c-scope done in 2022, he reports repeat is due in 2027, asked him to get us the results

## 2024-04-03 NOTE — PHYSICAL EXAM
[No Acute Distress] : no acute distress [Well Developed] : well developed [Normal Sclera/Conjunctiva] : normal sclera/conjunctiva [PERRL] : pupils equal round and reactive to light [Normal Outer Ear/Nose] : the outer ears and nose were normal in appearance [Normal Oropharynx] : the oropharynx was normal [No JVD] : no jugular venous distention [Normal Rate] : normal rate  [No Respiratory Distress] : no respiratory distress  [No Edema] : there was no peripheral edema [Normal Gait] : normal gait [Normal] : affect was normal and insight and judgment were intact

## 2024-04-03 NOTE — HEALTH RISK ASSESSMENT
[0] : 2) Feeling down, depressed, or hopeless: Not at all (0) [PHQ-2 Negative - No further assessment needed] : PHQ-2 Negative - No further assessment needed [Good] : ~his/her~  mood as  good [Yes] : Yes [4 or more  times a week (4 pts)] : 4 or more  times a week (4 points) [1 or 2 (0 pts)] : 1 or 2 (0 points) [Never (0 pts)] : Never (0 points) [No falls in past year] : Patient reported no falls in the past year [With Significant Other] : lives with significant other [Employed] : employed [Fully functional (bathing, dressing, toileting, transferring, walking, feeding)] : Fully functional (bathing, dressing, toileting, transferring, walking, feeding) [Fully functional (using the telephone, shopping, preparing meals, housekeeping, doing laundry, using] : Fully functional and needs no help or supervision to perform IADLs (using the telephone, shopping, preparing meals, housekeeping, doing laundry, using transportation, managing medications and managing finances) [Never] : Never [No] : In the past 12 months have you used drugs other than those required for medical reasons? No [de-identified] : stands for 8-10 hours a day at work, does strength training exercises 3-4 times a week [de-identified] : 'fish, vegetables, chicken' [[id number]] : 0 [Reports changes in hearing] : Reports no changes in hearing [Reports changes in vision] : Reports no changes in vision [Reports changes in dental health] : Reports no changes in dental health [ColonoscopyDate] : 2022 [ColonoscopyComments] : he reports polys due for repeat in 2027 [HIVDate] : 03/23 [HIVComments] : negative [HepatitisCDate] : 03/23 [HepatitisCComments] : negative [FreeTextEntry2] : [employer]

## 2024-04-04 ENCOUNTER — TRANSCRIPTION ENCOUNTER (OUTPATIENT)
Age: 64
End: 2024-04-04

## 2024-04-04 DIAGNOSIS — Z91.89 OTHER SPECIFIED PERSONAL RISK FACTORS, NOT ELSEWHERE CLASSIFIED: ICD-10-CM

## 2024-04-04 PROBLEM — J32.9 VIRAL SINUSITIS: Status: RESOLVED | Noted: 2023-06-26 | Resolved: 2024-04-04

## 2024-04-04 LAB
ANION GAP SERPL CALC-SCNC: 13 MMOL/L
BUN SERPL-MCNC: 16 MG/DL
CALCIUM SERPL-MCNC: 9.5 MG/DL
CHLORIDE SERPL-SCNC: 105 MMOL/L
CHOLEST SERPL-MCNC: 219 MG/DL
CHOLEST/HDLC SERPL: 3.7 RATIO
CO2 SERPL-SCNC: 26 MMOL/L
CREAT SERPL-MCNC: 0.81 MG/DL
CREAT SPEC-SCNC: 185 MG/DL
EGFR: 99 ML/MIN/1.73M2
GLUCOSE SERPL-MCNC: 109 MG/DL
HDLC SERPL-MCNC: 59 MG/DL
LDLC SERPL CALC-MCNC: 145 MG/DL
MICROALBUMIN 24H UR DL<=1MG/L-MCNC: <1.2 MG/DL
MICROALBUMIN/CREAT 24H UR-RTO: NORMAL MG/G
NONHDLC SERPL-MCNC: 160 MG/DL
POTASSIUM SERPL-SCNC: 4.2 MMOL/L
PSA SERPL-MCNC: 1.41 NG/ML
SODIUM SERPL-SCNC: 144 MMOL/L
TRIGL SERPL-MCNC: 84 MG/DL

## 2024-05-15 ENCOUNTER — RX RENEWAL (OUTPATIENT)
Age: 64
End: 2024-05-15

## 2024-05-15 RX ORDER — AMLODIPINE BESYLATE 5 MG/1
5 TABLET ORAL
Qty: 90 | Refills: 0 | Status: ACTIVE | COMMUNITY
Start: 2023-03-16 | End: 1900-01-01

## 2024-06-10 ENCOUNTER — RX RENEWAL (OUTPATIENT)
Age: 64
End: 2024-06-10

## 2024-06-10 RX ORDER — HYDROCHLOROTHIAZIDE 25 MG/1
25 TABLET ORAL
Qty: 90 | Refills: 1 | Status: ACTIVE | COMMUNITY
Start: 2022-08-17 | End: 1900-01-01

## 2024-09-13 ENCOUNTER — APPOINTMENT (OUTPATIENT)
Dept: OTOLARYNGOLOGY | Facility: CLINIC | Age: 64
End: 2024-09-13
Payer: COMMERCIAL

## 2024-09-13 ENCOUNTER — APPOINTMENT (OUTPATIENT)
Dept: OTOLARYNGOLOGY | Facility: CLINIC | Age: 64
End: 2024-09-13

## 2024-09-13 ENCOUNTER — NON-APPOINTMENT (OUTPATIENT)
Age: 64
End: 2024-09-13

## 2024-09-13 VITALS
DIASTOLIC BLOOD PRESSURE: 98 MMHG | WEIGHT: 184 LBS | HEART RATE: 66 BPM | HEIGHT: 67 IN | SYSTOLIC BLOOD PRESSURE: 151 MMHG | BODY MASS INDEX: 28.88 KG/M2 | TEMPERATURE: 98.1 F

## 2024-09-13 DIAGNOSIS — R26.89 OTHER ABNORMALITIES OF GAIT AND MOBILITY: ICD-10-CM

## 2024-09-13 DIAGNOSIS — H90.3 SENSORINEURAL HEARING LOSS, BILATERAL: ICD-10-CM

## 2024-09-13 DIAGNOSIS — H61.23 IMPACTED CERUMEN, BILATERAL: ICD-10-CM

## 2024-09-13 PROCEDURE — G2211 COMPLEX E/M VISIT ADD ON: CPT | Mod: NC

## 2024-09-13 PROCEDURE — 69210 REMOVE IMPACTED EAR WAX UNI: CPT

## 2024-09-13 PROCEDURE — 92550 TYMPANOMETRY & REFLEX THRESH: CPT | Mod: 52

## 2024-09-13 PROCEDURE — 92557 COMPREHENSIVE HEARING TEST: CPT

## 2024-09-13 PROCEDURE — 99205 OFFICE O/P NEW HI 60 MIN: CPT | Mod: 25

## 2024-09-13 NOTE — HISTORY OF PRESENT ILLNESS
[de-identified] : 63M with a hx of R tympanoplasty many decades ago who presents with hearing issues for many years that has been worsening recently. He notices problems in noisy environments. He also has been popping his ears a lot because he felt that his ears were muffled. No otalgia, otorrhea, vertigo, tinnitus. He also endorses recent imbalance that was associated with some speech issues and stuttering.

## 2024-09-13 NOTE — DATA REVIEWED
[de-identified] : Audiogram personally reviewed and interpreted and my findings are as follows (9/13/24): Right: SRT 20dB; WRS 84%; Type A tymp; normal downsloping to severe SNHL Left: SRT 20dB; WRS 88%; Type Ad tymp; normal downsloping to severe SNHL

## 2024-09-13 NOTE — PHYSICAL EXAM
[TextEntry] : General: AAO, no significant distress Psychiatric: affect pleasant and within normal limits Eyes: relatively symmetric, no obvious nystagmus Skin: no significant lesions on face Nose: no significant lesions; patent. Oral Cavity & Oropharynx: no significant deformity or lesions Neck/Lymphatics: no significant masses or abnormal cervical nodes palpated Respiratory: breathing comfortably; no significant distress Neurologic: cranial nerves II-XII grossly intact; EOMI Facial function: symmetric   Ear examination performed under binocular otologic microscope: Left Ear External: Pinna and periauricular area is normal. Canal: Ear canal skin is not inflamed or edematous. Cerumen impaction cleaned under microscopy with instrumentation Tympanic Membrane: Intact and in good position. Entire eardrum is herniated and flaccid   Right Ear External: Pinna and periauricular area is normal. Canal: Ear canal skin is not inflamed or edematous. Cerumen impaction cleaned under microscopy with instrumentation Tympanic Membrane: Intact and in good position. Posterior eardrum is herniated and flaccid   Procedure: Cerumen removal Pre-operative Diagnosis: Bilateral cerumen impaction Post-operative Diagnosis: Bilateral cerumen impaction Anesthesia: None Procedure Details: The patient was placed in the supine position. The left ear canal was determined to be impacted with cerumen. A curette and/or suction was used to remove the cerumen impaction under microscopy. The right ear canal was determined to be impacted with cerumen. A curette and/or suction was used to remove the cerumen impaction under microscopy. Condition: Stable. Patient tolerated procedure well. Complications: None.

## 2024-09-14 ENCOUNTER — EMERGENCY (EMERGENCY)
Facility: HOSPITAL | Age: 64
LOS: 1 days | Discharge: HOME | End: 2024-09-14
Attending: EMERGENCY MEDICINE | Admitting: EMERGENCY MEDICINE
Payer: COMMERCIAL

## 2024-09-14 VITALS
RESPIRATION RATE: 18 BRPM | HEART RATE: 58 BPM | TEMPERATURE: 98 F | OXYGEN SATURATION: 96 % | DIASTOLIC BLOOD PRESSURE: 88 MMHG | SYSTOLIC BLOOD PRESSURE: 174 MMHG

## 2024-09-14 VITALS
HEART RATE: 76 BPM | HEIGHT: 67 IN | WEIGHT: 179.9 LBS | DIASTOLIC BLOOD PRESSURE: 93 MMHG | TEMPERATURE: 99 F | SYSTOLIC BLOOD PRESSURE: 184 MMHG | RESPIRATION RATE: 18 BRPM | OXYGEN SATURATION: 96 %

## 2024-09-14 DIAGNOSIS — R47.81 SLURRED SPEECH: ICD-10-CM

## 2024-09-14 DIAGNOSIS — R53.83 OTHER FATIGUE: ICD-10-CM

## 2024-09-14 DIAGNOSIS — R20.0 ANESTHESIA OF SKIN: ICD-10-CM

## 2024-09-14 DIAGNOSIS — H53.8 OTHER VISUAL DISTURBANCES: ICD-10-CM

## 2024-09-14 DIAGNOSIS — I10 ESSENTIAL (PRIMARY) HYPERTENSION: ICD-10-CM

## 2024-09-14 DIAGNOSIS — R42 DIZZINESS AND GIDDINESS: ICD-10-CM

## 2024-09-14 LAB
ALBUMIN SERPL ELPH-MCNC: 4.5 G/DL — SIGNIFICANT CHANGE UP (ref 3.3–5)
ALP SERPL-CCNC: 65 U/L — SIGNIFICANT CHANGE UP (ref 40–120)
ALT FLD-CCNC: 22 U/L — SIGNIFICANT CHANGE UP (ref 10–45)
ANION GAP SERPL CALC-SCNC: 11 MMOL/L — SIGNIFICANT CHANGE UP (ref 5–17)
APPEARANCE UR: CLEAR — SIGNIFICANT CHANGE UP
AST SERPL-CCNC: 17 U/L — SIGNIFICANT CHANGE UP (ref 10–40)
BASOPHILS # BLD AUTO: 0.03 K/UL — SIGNIFICANT CHANGE UP (ref 0–0.2)
BASOPHILS NFR BLD AUTO: 0.5 % — SIGNIFICANT CHANGE UP (ref 0–2)
BILIRUB SERPL-MCNC: 0.4 MG/DL — SIGNIFICANT CHANGE UP (ref 0.2–1.2)
BILIRUB UR-MCNC: NEGATIVE — SIGNIFICANT CHANGE UP
BUN SERPL-MCNC: 15 MG/DL — SIGNIFICANT CHANGE UP (ref 7–23)
CALCIUM SERPL-MCNC: 9.5 MG/DL — SIGNIFICANT CHANGE UP (ref 8.4–10.5)
CHLORIDE SERPL-SCNC: 104 MMOL/L — SIGNIFICANT CHANGE UP (ref 96–108)
CO2 SERPL-SCNC: 26 MMOL/L — SIGNIFICANT CHANGE UP (ref 22–31)
COLOR SPEC: YELLOW — SIGNIFICANT CHANGE UP
CREAT SERPL-MCNC: 0.73 MG/DL — SIGNIFICANT CHANGE UP (ref 0.5–1.3)
DIFF PNL FLD: NEGATIVE — SIGNIFICANT CHANGE UP
EGFR: 102 ML/MIN/1.73M2 — SIGNIFICANT CHANGE UP
EOSINOPHIL # BLD AUTO: 0.04 K/UL — SIGNIFICANT CHANGE UP (ref 0–0.5)
EOSINOPHIL NFR BLD AUTO: 0.6 % — SIGNIFICANT CHANGE UP (ref 0–6)
GLUCOSE SERPL-MCNC: 123 MG/DL — HIGH (ref 70–99)
GLUCOSE UR QL: NEGATIVE MG/DL — SIGNIFICANT CHANGE UP
HCT VFR BLD CALC: 42.6 % — SIGNIFICANT CHANGE UP (ref 39–50)
HGB BLD-MCNC: 14.6 G/DL — SIGNIFICANT CHANGE UP (ref 13–17)
IMM GRANULOCYTES NFR BLD AUTO: 0.5 % — SIGNIFICANT CHANGE UP (ref 0–0.9)
KETONES UR-MCNC: NEGATIVE MG/DL — SIGNIFICANT CHANGE UP
LEUKOCYTE ESTERASE UR-ACNC: NEGATIVE — SIGNIFICANT CHANGE UP
LYMPHOCYTES # BLD AUTO: 1.48 K/UL — SIGNIFICANT CHANGE UP (ref 1–3.3)
LYMPHOCYTES # BLD AUTO: 22.9 % — SIGNIFICANT CHANGE UP (ref 13–44)
MAGNESIUM SERPL-MCNC: 1.7 MG/DL — SIGNIFICANT CHANGE UP (ref 1.6–2.6)
MCHC RBC-ENTMCNC: 29.5 PG — SIGNIFICANT CHANGE UP (ref 27–34)
MCHC RBC-ENTMCNC: 34.3 GM/DL — SIGNIFICANT CHANGE UP (ref 32–36)
MCV RBC AUTO: 86.1 FL — SIGNIFICANT CHANGE UP (ref 80–100)
MONOCYTES # BLD AUTO: 0.33 K/UL — SIGNIFICANT CHANGE UP (ref 0–0.9)
MONOCYTES NFR BLD AUTO: 5.1 % — SIGNIFICANT CHANGE UP (ref 2–14)
NEUTROPHILS # BLD AUTO: 4.54 K/UL — SIGNIFICANT CHANGE UP (ref 1.8–7.4)
NEUTROPHILS NFR BLD AUTO: 70.4 % — SIGNIFICANT CHANGE UP (ref 43–77)
NITRITE UR-MCNC: NEGATIVE — SIGNIFICANT CHANGE UP
NRBC # BLD: 0 /100 WBCS — SIGNIFICANT CHANGE UP (ref 0–0)
PH UR: 5.5 — SIGNIFICANT CHANGE UP (ref 5–8)
PLATELET # BLD AUTO: 212 K/UL — SIGNIFICANT CHANGE UP (ref 150–400)
POTASSIUM SERPL-MCNC: 3.4 MMOL/L — LOW (ref 3.5–5.3)
POTASSIUM SERPL-SCNC: 3.4 MMOL/L — LOW (ref 3.5–5.3)
PROT SERPL-MCNC: 6.7 G/DL — SIGNIFICANT CHANGE UP (ref 6–8.3)
PROT UR-MCNC: NEGATIVE MG/DL — SIGNIFICANT CHANGE UP
RBC # BLD: 4.95 M/UL — SIGNIFICANT CHANGE UP (ref 4.2–5.8)
RBC # FLD: 12.3 % — SIGNIFICANT CHANGE UP (ref 10.3–14.5)
SODIUM SERPL-SCNC: 141 MMOL/L — SIGNIFICANT CHANGE UP (ref 135–145)
SP GR SPEC: 1.01 — SIGNIFICANT CHANGE UP (ref 1–1.03)
TROPONIN T, HIGH SENSITIVITY RESULT: 9 NG/L — SIGNIFICANT CHANGE UP (ref 0–51)
UROBILINOGEN FLD QL: 0.2 MG/DL — SIGNIFICANT CHANGE UP (ref 0.2–1)
WBC # BLD: 6.45 K/UL — SIGNIFICANT CHANGE UP (ref 3.8–10.5)
WBC # FLD AUTO: 6.45 K/UL — SIGNIFICANT CHANGE UP (ref 3.8–10.5)

## 2024-09-14 PROCEDURE — 82962 GLUCOSE BLOOD TEST: CPT

## 2024-09-14 PROCEDURE — 70496 CT ANGIOGRAPHY HEAD: CPT | Mod: 26,MC

## 2024-09-14 PROCEDURE — 70551 MRI BRAIN STEM W/O DYE: CPT | Mod: MC

## 2024-09-14 PROCEDURE — 99291 CRITICAL CARE FIRST HOUR: CPT

## 2024-09-14 PROCEDURE — 93010 ELECTROCARDIOGRAM REPORT: CPT

## 2024-09-14 PROCEDURE — 99285 EMERGENCY DEPT VISIT HI MDM: CPT | Mod: 25

## 2024-09-14 PROCEDURE — 70498 CT ANGIOGRAPHY NECK: CPT | Mod: 26,MC

## 2024-09-14 PROCEDURE — 70551 MRI BRAIN STEM W/O DYE: CPT | Mod: 26,MC

## 2024-09-14 PROCEDURE — 93005 ELECTROCARDIOGRAM TRACING: CPT

## 2024-09-14 PROCEDURE — 71045 X-RAY EXAM CHEST 1 VIEW: CPT | Mod: 26

## 2024-09-14 PROCEDURE — 70450 CT HEAD/BRAIN W/O DYE: CPT | Mod: MC

## 2024-09-14 PROCEDURE — 70450 CT HEAD/BRAIN W/O DYE: CPT | Mod: 26,MC,59

## 2024-09-14 PROCEDURE — 85025 COMPLETE CBC W/AUTO DIFF WBC: CPT

## 2024-09-14 PROCEDURE — 84484 ASSAY OF TROPONIN QUANT: CPT

## 2024-09-14 PROCEDURE — 80053 COMPREHEN METABOLIC PANEL: CPT

## 2024-09-14 PROCEDURE — 36415 COLL VENOUS BLD VENIPUNCTURE: CPT

## 2024-09-14 PROCEDURE — 83735 ASSAY OF MAGNESIUM: CPT

## 2024-09-14 PROCEDURE — 70498 CT ANGIOGRAPHY NECK: CPT | Mod: MC

## 2024-09-14 PROCEDURE — 81003 URINALYSIS AUTO W/O SCOPE: CPT

## 2024-09-14 PROCEDURE — 71045 X-RAY EXAM CHEST 1 VIEW: CPT

## 2024-09-14 PROCEDURE — 70496 CT ANGIOGRAPHY HEAD: CPT | Mod: MC

## 2024-09-14 RX ORDER — ASPIRIN 81 MG
325 TABLET, DELAYED RELEASE (ENTERIC COATED) ORAL ONCE
Refills: 0 | Status: COMPLETED | OUTPATIENT
Start: 2024-09-14 | End: 2024-09-14

## 2024-09-14 RX ORDER — ASPIRIN 81 MG
1 TABLET, DELAYED RELEASE (ENTERIC COATED) ORAL
Qty: 21 | Refills: 0
Start: 2024-09-14 | End: 2024-10-04

## 2024-09-14 RX ADMIN — Medication 325 MILLIGRAM(S): at 20:39

## 2024-09-14 RX ADMIN — Medication 75 MILLIGRAM(S): at 21:45

## 2024-09-14 NOTE — ED PROVIDER NOTE - NSFOLLOWUPINSTRUCTIONS_ED_ALL_ED_FT
IT IS UNCLEAR WHAT CAUSED YOUR SYMPTOMS.    THE NUMBNESS IN THE FINGERS MAY BE RELATED TO THE PROBLEM WITH YOUR NECK.    HOWEVER, THE SYMPTOMS THAT YOU HAD ON THURSDAY-SLURRED SPEECH WITH CHANGE IN VISION, DIZZINESS COUPLED WITH YOUR HISTORY OF HYPERTENSION AND OTHER FACTORS MEANS IT IS STILL POSSIBLE THAT YOU ARE AT RISK FOR STROKE.  THIS IS TRUE EVEN WITH THE NORMAL CAT SCANS AND MRI TODAY.    BECAUSE OF THIS, IT IS RECOMMENDED THAT YOU TAKE A BABY ASPIRIN ONCE A DAY AS WELL AS A BLOOD THINNER CALLED PLAVIX 75MG ONCE A DAY.  YOU WILL TAKE BOTH OF THESE MEDICATIONS ONCE A DAY FOR 3 WEEKS.  AFTER 3 WEEKS YOU SHOULD TAKE A BABY ASPIRIN (81 MG) ONCE A DAY.    IT IS EXTREMELY IMPORTANT THAT YOU FOLLOW UP WITH A NEUROLOGIST NEXT WEEK.  NAMES OF DOCTORS APPEAR BELOW BUT ANY NEUROLOGIST ON YOUR PLAN IS FINE.    UNTIL THEN, MONITOR YOUR SYMPTOMS CLOSELY.  ANY TIME YOU HAVE SYMPTOMS THAT COULD BE A STROKE, YOU SHOULD GO TO THE NEAREST EMERGENCY ROOM IMMEDIATELY     RETURN TO THE EMERGENCY ROOM IMMEDIATELY FOR:  CHANGE IN SEEING, WALKING OR TALKING  SPINNING DIZZINESS  SLURRED SPEECH, TROUBLE FINDING OR SAYING WORDS  WEAKNESS OR DROOPING OF THE FACE  WEAKNESS OR NUMBNESS TO THE ARMS OR LEGS  HEADACHE  VOMITING  CHEST PAIN   TROUBLE BREATHING  ABNORMALLY FAST HEART BEAT OR SKIPPING BEATS   ANY NEW OR CONCERNING SYMPTOMS.        Transient Ischemic Attack--WE ARE NOT SURE IF THIS IS WHAT YOU HAD.  A transient ischemic attack (TIA) causes the same symptoms as a stroke, but the symptoms go away quickly. A TIA happens when blood flow to the brain is blocked. Having a TIA means you may be at risk for a stroke. A TIA is a medical emergency.    What are the causes?  A TIA is caused by a blocked artery in the head or neck. This means the brain does not get the blood supply it needs. A blockage can be caused by:  Fatty buildup in an artery in the head or neck.  A blood clot.  A tear in an artery.  Irritation and swelling (inflammation) of an artery.  Sometimes the cause is not known.    What increases the risk?  Certain things may make you more likely to have a TIA. Some of these are things that you can change, such as:  Using products that have nicotine or tobacco.  Not being active.  Drinking too much alcohol.  Using recreational drugs.  Health conditions that may increase your risk include:  High blood pressure.  High cholesterol.  Diabetes.  Heart disease.  A heartbeat that is not regular (atrial fibrillation).  Sickle cell disease.  Problems with blood clotting.  Other risk factors include:  Being over the age of 60.  Being male.  Being very overweight.  Sleep problems (sleep apnea).  Having a family history of stroke.  Having had blood clots, stroke, TIA, or heart attack in the past.  What are the signs or symptoms?  A normal face next to a face that is drooping on one side due to weakness from transient ischemic attack.  The symptoms of a TIA are like those of a stroke. They can include:  Weakness or loss of feeling in your face, arm, or leg. This often happens on one side of your body.  Trouble walking.  Trouble moving your arms or legs.  Trouble talking or understanding what people are saying.  Problems with how you see.  Feeling dizzy.  Feeling confused.  Loss of balance or coordination.  Feeling like you may vomit (nausea) or vomiting.  Having a very bad headache.  If you can, note what time you started to have symptoms. Tell your doctor.    How is this treated?  The goal of treatment is to lower the risk for a stroke. This may include:  Changes to diet and lifestyle, such as getting regular exercise and stopping smoking.  Taking medicines to:  Thin the blood.  Lower blood pressure.  Lower cholesterol.  Treating other health conditions, such as diabetes.  If testing shows that an artery in your brain is narrow, your doctor may recommend a procedure to:  Take the blockage out of your artery.  Open or widen an artery in your neck (carotid angioplasty and stenting).  Follow these instructions at home:  Medicines    Take over-the-counter and prescription medicines only as told by your doctor.  If you were told to take aspirin or another medicine to thin your blood, use it exactly as told by your doctor.  Taking too much of the medicine can cause bleeding.  Taking too little of the medicine may not work to treat the problem.  Eating and drinking    A plate with examples of foods in a healthy diet.  Eat 5 or more servings of fruits and vegetables each day.  Follow instructions from your doctor about your diet. You may need to follow a certain diet to help lower your risk of a stroke. You may need to:  Eat a diet that is low in fat and salt.  Eat foods with a lot of fiber.  Limit carbohydrates and sugar.  If you drink alcohol:  Limit how much you have to:  0–1 drink a day for women who are not pregnant.  0–2 drinks a day for men.  Know how much alcohol is in a drink. In the [place] one drink equals one 12 oz bottle of beer (355 mL), one 5 oz glass of wine (148 mL), or one 1½ oz glass of hard liquor (44 mL).  General instructions    Keep a healthy weight.  Try to get at least 30 minutes of exercise on most days.  Get treatment if you have sleep problems.  Do not smoke or use any products that contain nicotine or tobacco. If you need help quitting, ask your doctor.  Do not use drugs.  Keep all follow-up visits. Your doctor will want to know if you have any more symptoms and to check blood labs if any medicines were prescribed.  Where to find more information  American Stroke Association: stroke.org  Get help right away if:  You have chest pain.  You have a heartbeat that is not regular.  You have any signs of a stroke. "BE FAST" is an easy way to remember the main warning signs:  B - Balance. Dizziness, sudden trouble walking, or loss of balance.  E - Eyes. Trouble seeing or a change in how you see.  F - Face. Sudden weakness or loss of feeling of the face. The face or eyelid may droop on one side.  A - Arms. Weakness or loss of feeling in an arm. This happens all of a sudden and most often on one side of the body.  S - Speech. Sudden trouble speaking, slurred speech, or trouble understanding what people say.  T - Time. Time to call emergency services. Write down what time symptoms started.  You have other signs of a stroke, such as:  A sudden, very bad headache with no known cause.  Feeling like you may vomit.  Vomiting.  A seizure.  These symptoms may be an emergency. Get help right away. Call 911.  Do not wait to see if the symptoms will go away.  Do not drive yourself to the hospital.  This information is not intended to replace advice given to you by your health care provider. Make sure you discuss any questions you have with your health care provider.    Document Revised: 06/02/2023 Document Reviewed: 06/02/2023

## 2024-09-14 NOTE — CONSULT NOTE ADULT - ASSESSMENT
63y Male with PMHx of HTN, cervical disc herniation, L shoulder surgery (rotator cuff) and right tympanoplasty (~20 yrs ago), presented to [facility] ED after he experienced numbness in his left fingertips beginning today. Reports that this has occurred previously, last episode around 2021 and lasted a few days. States he thinks the cold makes it worse but has not taken any medications for it. Denies any pain, weakness of extremities, recent trauma of his hands. In the ED, Pt was also evaluated by stroke for recent symptoms of dizziness and slurred speech which now resolved. MRI brain non con negative for any acute infarction and rec'd no further stroke work up indicated at this time. On exam, no focal deficits. Differential includes Cervical spine myelopathy given reported history of cervical spine disc herniation vs shoulder nerve impingement vs less likely underlying vascular disorder.     Recommendation:    -Neck X-Ray  -PT for neck and shoulder given history of surgery and disc herniation.    Plan discussed with attending .

## 2024-09-14 NOTE — ED PROVIDER NOTE - OBJECTIVE STATEMENT
62 yo M PMH HTN on amlodipine and HCTZ presents with numbness to fingers 2,3,4 on left hand  Symptoms started 08:30 today while walking to work.  Symptoms have improved and are mild now.  No other symptoms today  However, on Thursday, starting at 2pm while at work, patient became tired and lightheaded.    At that time he also noted that it was difficult to say the words he wanted to say-he said the correct words but he was stuttering  The issue with his speech lasted through the evening.  His wife noted that his speech was "slow and slurred" but he did not have a facial droop  On Friday, the speech was much better, but still not back to normal. today, his speech is normal  On Thursday he was also off balance and had an episode of being off balance yesterday  He also noted blurrier than usual vision when he tried to read yesterday   At no time has he had weakness to the face, arms or legs.   On Friday, he noted his heart was beating fast but no associated CP or SOB.    No ha, fever, vomiting, diarrhea, abd pain  He has been having pressure in the right ear for 2-3 days for which he saw ENT yesterday.  Large cerumen impaction removed with relief (0) independent

## 2024-09-14 NOTE — CONSULT NOTE ADULT - SUBJECTIVE AND OBJECTIVE BOX
**STROKE CONSULT NOTE**    Last known well time/Time of onset of symptoms:    HPI: 63y Male with PMHx of     T(C): 36.7 (09-14-24 @ 18:27), Max: 37 (09-14-24 @ 12:03)  HR: 68 (09-14-24 @ 18:27) (63 - 76)  BP: 165/91 (09-14-24 @ 18:27) (162/93 - 184/93)  RR: 20 (09-14-24 @ 18:27) (16 - 20)  SpO2: 98% (09-14-24 @ 18:27) (96% - 98%)    PAST MEDICAL & SURGICAL HISTORY:  HTN (hypertension)          FAMILY HISTORY:      SOCIAL HISTORY:   Patient lives with *** at ***.   Smoking status:  Drinking:  Drug Use:     ROS: ***  Constitutional: No fever, weight loss or fatigue  Eyes: No eye pain, visual disturbances, or discharge  ENMT:  No difficulty hearing, tinnitus; No sinus or throat pain  Neck: No pain or stiffness  Respiratory: No cough, wheezing, chills or hemoptysis  Cardiovascular: No chest pain, palpitations, shortness of breath, or leg swelling  Gastrointestinal: No abdominal pain. No nausea, vomiting or hematemesis; No diarrhea or constipation. Nohematochezia.  Genitourinary: No dysuria, frequency, hematuria or incontinence  Neurological: As per HPI  Skin: No itching, burning, rashes or lesions   Endocrine: No heat or cold intolerance; No hair loss  Musculoskeletal: No joint pain or swelling; No muscle, back or extremity pain  Heme/Lymph: No easy bruising or bleeding gums    MEDICATIONS  (STANDING):    MEDICATIONS  (PRN):    Allergies    No Known Allergies    Intolerances      Vital Signs Last 24 Hrs  T(C): 36.7 (14 Sep 2024 18:27), Max: 37 (14 Sep 2024 12:03)  T(F): 98.1 (14 Sep 2024 18:27), Max: 98.6 (14 Sep 2024 12:03)  HR: 68 (14 Sep 2024 18:27) (63 - 76)  BP: 165/91 (14 Sep 2024 18:27) (162/93 - 184/93)  BP(mean): --  RR: 20 (14 Sep 2024 18:27) (16 - 20)  SpO2: 98% (14 Sep 2024 18:27) (96% - 98%)    Parameters below as of 14 Sep 2024 18:27  Patient On (Oxygen Delivery Method): room air        Physical exam:  Constitutional: No acute distress, conversant  Eyes: Anicteric sclerae, moist conjunctivae, see below for CNs  Neck: trachea midline, FROM, supple, no thyromegaly or lymphadenopathy  Cardiovascular: Regular rate and rhythm, no murmurs, rubs, or gallops. No carotid bruits.   Pulmonary: Anterior breath sounds clear bilaterally, no crackles or wheezing. No use of accessory muscles  GI: Abdomen soft, non-distended, non-tender  Extremities: Radial and DP pulses +2, no edema    Neurologic:  -Mental status: Awake, alert, oriented to person, place, and time. Speech is fluent with intact naming, repetition, and comprehension, no dysarthria. Recent and remote memory intact. Follows commands. Attention/concentration intact. Fund of knowledge appropriate.  -Cranial nerves:   II: Visual fields are full to confrontation.  III, IV, VI: Extraocular movements are intact without nystagmus. Pupils equally round and reactive to light  V:  Facial sensation V1-V3 equal and intact   VII: Face is symmetric with normal eye closure and smile  VIII: Hearing is bilaterally intact to finger rub  IX, X: Uvula is midline and soft palate rises symmetrically  XI: Head turning and shoulder shrug are intact.  XII: Tongue protrudes midline  Motor: Normal bulk and tone. No pronator drift. Strength bilateral upper extremity 5/5, bilateral lower extremities 5/5.  Rapid alternating movements intact and symmetric  Sensation: Intact to light touch bilaterally. No neglect or extinction on double simultaneous testing.  Coordination: No dysmetria on finger-to-nose and heel-to-shin bilaterally  Reflexes: Downgoing toes bilaterally   Gait: Narrow gait and steady    NIHSS: **** ASPECT Score: ***** ICH Score: ****** (GCS)    Fingerstick Blood Glucose: CAPILLARY BLOOD GLUCOSE      POCT Blood Glucose.: 131 mg/dL (14 Sep 2024 12:02)    LABS:                        14.6   6.45  )-----------( 212      ( 14 Sep 2024 12:37 )             42.6     09-14    141  |  104  |  15  ----------------------------<  123<H>  3.4<L>   |  26  |  0.73    Ca    9.5      14 Sep 2024 12:37  Mg     1.7     09-14    TPro  6.7  /  Alb  4.5  /  TBili  0.4  /  DBili  x   /  AST  17  /  ALT  22  /  AlkPhos  65  09-14          Urinalysis Basic - ( 14 Sep 2024 12:37 )    Color: x / Appearance: x / SG: x / pH: x  Gluc: 123 mg/dL / Ketone: x  / Bili: x / Urobili: x   Blood: x / Protein: x / Nitrite: x   Leuk Esterase: x / RBC: x / WBC x   Sq Epi: x / Non Sq Epi: x / Bacteria: x        RADIOLOGY & ADDITIONAL STUDIES:      -----------------------------------------------------------------------------------------------------------------  IV-tPA (Y/N):    ***                              Bolus time:    Alteplase Dose Verification w/ RN:  Reason IV-tPA not given: ***    **STROKE CONSULT NOTE**    HPI: 63y Male with PMHx of cervical disc disease, L shoulder surgery, umbilical hernia repair, and HTN, presenting to [facility] ED after he experienced numbness in his left fingertips beginning today. Per patient and his wife, this past Thursday while patient was at work he suddenly felt very fatigued and dizzy; these symptoms were followed by stuttering speech as well as slurred speech that lasted for approximately 6 hours before self-resolving. He also endorses a headache as well as noticing that his vision was blurrier than normal while using his reading glasses, now resolved. On Friday, he saw his ear doctor to have cerumen removed out of his left year because he was experiencing pain and fullness. Today, while walking, he noticed that the fingertips of his left hand felt numb. He reports that this has occurred before, but not for some time. NIHSS 0. NCHCT, CTA head and neck negative for any abnormalities. MRI brain without contrast short stroke protocol performed and negative for any acute infarction despite symptoms lasting for approximately 6 hours on Thursday.     T(C): 36.7 (09-14-24 @ 18:27), Max: 37 (09-14-24 @ 12:03)  HR: 68 (09-14-24 @ 18:27) (63 - 76)  BP: 165/91 (09-14-24 @ 18:27) (162/93 - 184/93)  RR: 20 (09-14-24 @ 18:27) (16 - 20)  SpO2: 98% (09-14-24 @ 18:27) (96% - 98%)    PAST MEDICAL & SURGICAL HISTORY:  HTN (hypertension)          FAMILY HISTORY:      SOCIAL HISTORY:   Patient lives with wife    ROS:  Constitutional: No fever, weight loss or fatigue  Eyes: No eye pain, visual disturbances, or discharge  ENMT:  No difficulty hearing, tinnitus; No sinus or throat pain  Neck: No pain or stiffness  Respiratory: No cough, wheezing, chills or hemoptysis  Cardiovascular: No chest pain, palpitations, shortness of breath, or leg swelling  Gastrointestinal: No abdominal pain. No nausea, vomiting or hematemesis; No diarrhea or constipation. Nohematochezia.  Genitourinary: No dysuria, frequency, hematuria or incontinence  Neurological: As per HPI  Skin: No itching, burning, rashes or lesions   Endocrine: No heat or cold intolerance; No hair loss  Musculoskeletal: No joint pain or swelling; No muscle, back or extremity pain  Heme/Lymph: No easy bruising or bleeding gums    MEDICATIONS  (STANDING):    MEDICATIONS  (PRN):    Allergies    No Known Allergies    Intolerances      Vital Signs Last 24 Hrs  T(C): 36.7 (14 Sep 2024 18:27), Max: 37 (14 Sep 2024 12:03)  T(F): 98.1 (14 Sep 2024 18:27), Max: 98.6 (14 Sep 2024 12:03)  HR: 68 (14 Sep 2024 18:27) (63 - 76)  BP: 165/91 (14 Sep 2024 18:27) (162/93 - 184/93)  BP(mean): --  RR: 20 (14 Sep 2024 18:27) (16 - 20)  SpO2: 98% (14 Sep 2024 18:27) (96% - 98%)    Parameters below as of 14 Sep 2024 18:27  Patient On (Oxygen Delivery Method): room air        Physical exam:  Constitutional: No acute distress, conversant      Neurologic:  -Mental status: Awake, alert, oriented to person, place, and time. Speech is fluent with intact naming, repetition, and comprehension, no dysarthria. Recent and remote memory intact. Follows commands. Attention/concentration intact. Fund of knowledge appropriate.  -Cranial nerves:   II: Visual fields are full to confrontation.  III, IV, VI: Extraocular movements are intact without nystagmus. Pupils equally round and reactive to light  V:  Facial sensation V1-V3 equal and intact   VII: Face is symmetric with normal eye closure and smile  Motor: Normal bulk and tone. No pronator drift. Strength bilateral upper extremity 5/5, bilateral lower extremities 5/5.  Sensation: Intact to light touch bilaterally. No neglect or extinction on double simultaneous testing.  Coordination: No dysmetria on finger-to-nose    NIHSS: 0    Fingerstick Blood Glucose: CAPILLARY BLOOD GLUCOSE      POCT Blood Glucose.: 131 mg/dL (14 Sep 2024 12:02)    LABS:                        14.6   6.45  )-----------( 212      ( 14 Sep 2024 12:37 )             42.6     09-14    141  |  104  |  15  ----------------------------<  123<H>  3.4<L>   |  26  |  0.73    Ca    9.5      14 Sep 2024 12:37  Mg     1.7     09-14    TPro  6.7  /  Alb  4.5  /  TBili  0.4  /  DBili  x   /  AST  17  /  ALT  22  /  AlkPhos  65  09-14          Urinalysis Basic - ( 14 Sep 2024 12:37 )    Color: x / Appearance: x / SG: x / pH: x  Gluc: 123 mg/dL / Ketone: x  / Bili: x / Urobili: x   Blood: x / Protein: x / Nitrite: x   Leuk Esterase: x / RBC: x / WBC x   Sq Epi: x / Non Sq Epi: x / Bacteria: x        RADIOLOGY & ADDITIONAL STUDIES:    < from: CT Head No Cont (09.14.24 @ 13:58) >    IMPRESSION:    CT HEAD:    No CT evidence for acute territorial infarct, hematoma or mass effect.    CTA HEAD:    No high-grade stenosis, large vessel occlusion, AVM or aneurysms.    CTA NECK:    No high-grade stenosis or occlusion in extracranial carotid and vertebral   arteries.        < end of copied text >    < from: MR Head No Cont (09.14.24 @ 17:48) >    IMPRESSION:    No acute intracranial pathology. No acute infarct or intracranial   hemorrhage.    < end of copied text >

## 2024-09-14 NOTE — ED PROVIDER NOTE - CLINICAL SUMMARY MEDICAL DECISION MAKING FREE TEXT BOX
64 yo M PMH HTN presenting with improving numbness to fingers 2-3 on the left hand but with report of slurred speech, unsteady gait and blurred vision Thursday into Friday.  Symptoms highly concerning for CVA and so patient will be sent for CTs.  Will call stroke team.      12:27:  Stroke team notified of case. 62 yo M PMH HTN presenting with improving numbness to fingers 2-3 on the left hand but with report of slurred speech, unsteady gait and blurred vision Thursday into Friday.  Symptoms highly concerning for CVA and so patient will be sent for CTs.  Will call stroke team.      12:27:  Stroke team notified of case.      20:50:  Case discussed in detail with Dr. Alcantar as General Neurology asked that I refer the case back to them.  She reviewed the case with Dr. Hein and checked patient's results herself.  She advised that due to his ABCD2 score of 5 that he should have asa 81 and plavix 75 for 3 wks followed by asa only as long as he is not at risk for bleeding.  He will be given Neuro team follow up information. 64 yo M PMH HTN presenting with improving numbness to fingers 2-3 on the left hand but with report of slurred speech, unsteady gait and blurred vision Thursday into Friday.  Symptoms highly concerning for CVA and so patient will be sent for CTs.  Will call stroke team.      12:27:  Stroke team notified of case.      20:50: Pt has remained well.   Added hx that finger numbness has been a symptom in the past and that he has issues with the nerve in his neck.  Stroke team had performed their assessment and MRI ordered.  MRI was without evidence of stroke and so case referred to general neurology. Case now discussed in detail with Dr. Alcantar as General Neurology asked that I refer the case back to them.  She reviewed the case with Dr. Hein and checked patient's results herself.  She advised that due to his ABCD2 score of 5 that he should have asa 81 and plavix 75 for 3 wks followed by asa only as long as he is not at risk for bleeding.  He will be given Neuro team follow up information.  He has no prior hx of gi/neuro/ocular bleeding and we discuss use of plavix.     Discussed results, dx, treatment, follow up plan as well as strict return precautions in great detail with the patient and/or family member(s).  Questions answered and patient and or family member(s) verbalized understanding of the discussion and plan

## 2024-09-14 NOTE — CONSULT NOTE ADULT - ASSESSMENT
63y Male with PMHx of cervical disc disease, L shoulder surgery, umbilical hernia repair, and HTN, presenting to [facility] ED after he experienced numbness in his left fingertips beginning today, preceded by an episode of unstable gait, blurry vision, and speech changes that occurred between Thursday and Friday, now resolved. NIHSS 0. NCHCT, CTA head and neck negative for any abnormalities. MRI brain without contrast short stroke protocol performed and negative for any acute infarction despite symptoms lasting for approximately 6 hours on Thursday.     - INCOMPLETE 63y Male with PMHx of cervical disc disease, L shoulder surgery, umbilical hernia repair, and HTN, presenting to [facility] ED after he experienced numbness in his left fingertips beginning today, preceded by an episode of unstable gait, blurry vision, and speech changes that occurred between Thursday and Friday, now resolved. NIHSS 0. NCHCT, CTA head and neck negative for any abnormalities. MRI brain without contrast short stroke protocol performed and negative for any acute infarction despite symptoms lasting for approximately 6 hours on Thursday.     - no further stroke work up indicated at this time  - consider general neurology consult      Discussed with Dr. Lowery

## 2024-09-14 NOTE — ED PROVIDER NOTE - PHYSICAL EXAMINATION
General:  Well appearing, no distress  HEENT:  No conjunctival injection, neck supple, no congestion   Chest:  Non-tender, no crepitance  Lungs:  Clear to auscultation bilaterally   Heart:  s1s2 normal, no murmur  Abdomen:  soft, non-tender, non-distended  :  Deferred  Rectal:  Deferred  Extremities: No edema, normal perfusion, no joint swelling or tenderness  Neuro:  Alert, oriented with clear fluent speech, no facial droop, EOMI, drift LLE but does not touch the bed, normal finger to nose and heel to shin.

## 2024-09-14 NOTE — ED PROVIDER NOTE - ATTENDING CONTRIBUTION TO CARE
The patient was seen immediately upon arrival due to a high probability of imminent or life-threatening deterioration secondary to stroke symptoms  which required my direct attention, intervention, and personal management at the bedside. I have personally provided critical care time exclusive of time spent on separately billable procedures. Time includes review of laboratory data, radiology results, discussion with consultants, discussion with the patient's family, and monitoring for potential decompensation.

## 2024-09-14 NOTE — CONSULT NOTE ADULT - SUBJECTIVE AND OBJECTIVE BOX
NEUROLOGY CONSULT    HPI:    63y Male with PMHx of HTN, cervical disc disease, L shoulder surgery (rotator cuff) and right tympanoplasty (~20 yrs ago), presented to [facility] ED after he experienced numbness in his left fingertips which started today. Per patient and his wife, this past Thursday while patient was at work he suddenly felt very dizzy and felt off balance; these symptoms were followed by difficulty with speech. Pt reports he has been having hearing issues with ear fullness and pain that has been getting progressively worse leading up to the dizziness. He went to an ENT on 9/13 to have cerumen removed out of his left ear and states his symptoms got much better after and eventually self resolved. He also noticed some blurry vision with headache yesterday, which also resolved. Today, he noticed that the fingertips of his left hand felt numb. He reports that this has happened before, last episode occurred in 2021 and lasted a few days. States he thinks the cold makes it worse but has not taken any medications for it. Denies any pain, weakness of distal extremities, recent trauma or illness.   In the ED, Pt evaluated by stroke team. MRI brain non con negative for any acute infarction and rec'd no further stroke work up indicated at this time.         MEDICATIONS  Home Medications:    MEDICATIONS  (STANDING):    MEDICATIONS  (PRN):      FAMILY HISTORY:    SOCIAL HISTORY: negative for tobacco, alcohol, or ilicit drug use.    Allergies    No Known Allergies    Intolerances        GEN: NAD, pleasant, cooperative    NEURO:   MENTAL STATUS: AAOx3  LANG/SPEECH: Fluent, intact naming, repetition & comprehension  CRANIAL NERVES:  II: Pupils equal and reactive, no RAPD, normal visual field and fundus  III, IV, VI: EOM intact, no gaze preference or deviation  V: normal  VII: no facial asymmetry  VIII: normal hearing to speech  MOTOR: 5/5 in both upper and lower extremities  REFLEXES: 2/4 throughout, bilateral flexor plantars  SENSORY: Normal to touch, temperature & pin prick in all extremities  COORD: Normal finger to nose and heel to shin, no tremor, no dysmetria  Gait: Deferred     LABS:                        14.6   6.45  )-----------( 212      ( 14 Sep 2024 12:37 )             42.6     09-14    141  |  104  |  15  ----------------------------<  123<H>  3.4<L>   |  26  |  0.73    Ca    9.5      14 Sep 2024 12:37  Mg     1.7     09-14    TPro  6.7  /  Alb  4.5  /  TBili  0.4  /  DBili  x   /  AST  17  /  ALT  22  /  AlkPhos  65  09-14    Hemoglobin A1C:   Vitamin B12     CAPILLARY BLOOD GLUCOSE      POCT Blood Glucose.: 131 mg/dL (14 Sep 2024 12:02)      Urinalysis Basic - ( 14 Sep 2024 12:37 )    Color: x / Appearance: x / SG: x / pH: x  Gluc: 123 mg/dL / Ketone: x  / Bili: x / Urobili: x   Blood: x / Protein: x / Nitrite: x   Leuk Esterase: x / RBC: x / WBC x   Sq Epi: x / Non Sq Epi: x / Bacteria: x            Microbiology:      RADIOLOGY, EKG AND ADDITIONAL TESTS: Reviewed.    MRI Head non con:   No acute intracranial pathology. No acute infarct or intracranial hemorrhage.    CT HEAD:  No CT evidence for acute territorial infarct, hematoma or mass effect.    CTA HEAD:  No high-grade stenosis, large vessel occlusion, AVM or aneurysms.    CTA NECK:  No high-grade stenosis or occlusion in extracranial carotid and vertebral arteries.

## 2024-09-14 NOTE — ED PROVIDER NOTE - PATIENT PORTAL LINK FT
You can access the FollowMyHealth Patient Portal offered by [facility] by registering at the following website: [email or web address]. By joining [employer]’s FollowMyHealth portal, you will also be able to view your health information using other applications (apps) compatible with our system.

## 2024-09-14 NOTE — ED ADULT NURSE NOTE - NS ED NURSE LEVEL OF CONSCIOUSNESS ORIENTATION
Addended by: TRAY EDWARD on: 4/29/2022 12:06 PM     Modules accepted: Orders    
well developed, well nourished , in no acute distress , ambulating without difficulty , normal communication ability
Oriented - self; Oriented - place; Oriented - time

## 2024-09-14 NOTE — ED ADULT TRIAGE NOTE - CHIEF COMPLAINT QUOTE
Pt c/o episode of slurred speech with lightheadedness for approximately 6 hours on Thursday, b/l numb finger tips x this morning. PMH HTN. EKG and f/s initiated in triage.

## 2024-09-14 NOTE — CONSULT NOTE ADULT - NSCONSULTADDITIONALINFOA_GEN_ALL_CORE
attending addendum    I discussed the case with the resident, presentation suggestive of cervical dizziness and mild cervical radiculopathy. consider checking XR C spine and would refer for outpatient neck PT. if not responsive to neck PT would consider thoracic outlet syndrome either neurogenic or vascular as a cause    patient discharged before my evaluation, no bill for this service

## 2024-09-14 NOTE — ED ADULT NURSE REASSESSMENT NOTE - NS ED NURSE REASSESS COMMENT FT1
pt resting in bed, breathing at ease on room air, no complaints, no distress noted, awaiting stroke team eval, ongoing monitoring.
negative - no chest pain

## 2024-09-14 NOTE — ED ADULT NURSE NOTE - OBJECTIVE STATEMENT
pt reports having an episode in thursday where he felt unsteady and unclear speech that lasted 6 hours, saw ENT on Friday and had "a lot of ear wax pulled out". today patient reports feeling tired and numbness and tingling to left five fingers since 9AM

## 2024-09-16 PROBLEM — I10 ESSENTIAL (PRIMARY) HYPERTENSION: Chronic | Status: ACTIVE | Noted: 2024-09-14

## 2024-09-18 ENCOUNTER — APPOINTMENT (OUTPATIENT)
Dept: OTOLARYNGOLOGY | Facility: CLINIC | Age: 64
End: 2024-09-18

## 2024-09-23 ENCOUNTER — RX RENEWAL (OUTPATIENT)
Age: 64
End: 2024-09-23

## 2024-09-30 ENCOUNTER — APPOINTMENT (OUTPATIENT)
Dept: OTOLARYNGOLOGY | Facility: CLINIC | Age: 64
End: 2024-09-30
Payer: COMMERCIAL

## 2024-09-30 VITALS
BODY MASS INDEX: 28.25 KG/M2 | HEART RATE: 76 BPM | TEMPERATURE: 97.8 F | DIASTOLIC BLOOD PRESSURE: 104 MMHG | SYSTOLIC BLOOD PRESSURE: 152 MMHG | WEIGHT: 180 LBS | HEIGHT: 67 IN

## 2024-09-30 DIAGNOSIS — H71.91 UNSPECIFIED CHOLESTEATOMA, RIGHT EAR: ICD-10-CM

## 2024-09-30 DIAGNOSIS — H61.23 IMPACTED CERUMEN, BILATERAL: ICD-10-CM

## 2024-09-30 DIAGNOSIS — H90.3 SENSORINEURAL HEARING LOSS, BILATERAL: ICD-10-CM

## 2024-09-30 DIAGNOSIS — R26.89 OTHER ABNORMALITIES OF GAIT AND MOBILITY: ICD-10-CM

## 2024-09-30 PROCEDURE — 69210 REMOVE IMPACTED EAR WAX UNI: CPT

## 2024-09-30 PROCEDURE — 99214 OFFICE O/P EST MOD 30 MIN: CPT | Mod: 25

## 2024-09-30 RX ORDER — ASPIRIN ENTERIC COATED TABLETS 81 MG 81 MG/1
81 TABLET, DELAYED RELEASE ORAL
Refills: 0 | Status: ACTIVE | COMMUNITY

## 2024-09-30 NOTE — REASON FOR VISIT
[Initial Consultation] : an initial consultation for [Subsequent Evaluation] : a subsequent evaluation for [FreeTextEntry2] : right ear discomfort

## 2024-09-30 NOTE — HISTORY OF PRESENT ILLNESS
[de-identified] : 63M with a hx of R tympanoplasty many decades ago who presents with hearing issues for many years that has been worsening recently. He notices problems in noisy environments. He also has been popping his ears a lot because he felt that his ears were muffled. No otalgia, otorrhea, vertigo, tinnitus. He also endorses recent imbalance that was associated with some speech issues and stuttering.   9/30/24: Patient went to ER on 9/16/24 for paresthesias of the right arm. Workup did not demonstrate a stroke, possibly TIA. Right ear is bothering the patient.

## 2024-09-30 NOTE — HISTORY OF PRESENT ILLNESS
[de-identified] : 63M with a hx of R tympanoplasty many decades ago who presents with hearing issues for many years that has been worsening recently. He notices problems in noisy environments. He also has been popping his ears a lot because he felt that his ears were muffled. No otalgia, otorrhea, vertigo, tinnitus. He also endorses recent imbalance that was associated with some speech issues and stuttering.   9/30/24: Patient went to ER on 9/16/24 for paresthesias of the right arm. Workup did not demonstrate a stroke, possibly TIA. Right ear is bothering the patient.

## 2024-09-30 NOTE — PHYSICAL EXAM
[TextEntry] : General: AAO, no significant distress Psychiatric: affect pleasant and within normal limits Eyes: relatively symmetric, no obvious nystagmus Skin: no significant lesions on face Nose: no significant lesions; patent. Oral Cavity & Oropharynx: no significant deformity or lesions Neck/Lymphatics: no significant masses or abnormal cervical nodes palpated Respiratory: breathing comfortably; no significant distress Neurologic: cranial nerves II-XII grossly intact; EOMI Facial function: symmetric   Ear examination performed under binocular otologic microscope: Left Ear External: Pinna and periauricular area is normal. Canal: Ear canal skin is not inflamed or edematous.  Tympanic Membrane: Intact and in good position. Entire eardrum is herniated and flaccid   Right Ear External: Pinna and periauricular area is normal. Canal: Ear canal skin is not inflamed or edematous. Cerumen impaction cleaned under microscopy with instrumentation Tympanic Membrane: Intact and in good position. Posterior eardrum is herniated and flaccid. Prussak with retraction pocket   Procedure: Cerumen removal Pre-operative Diagnosis: Right cerumen impaction Post-operative Diagnosis: Right cerumen impaction Anesthesia: None Procedure Details: The patient was placed in the supine position. The right eardrum and Prussak's space was impacted with cerumen. A curette and/or suction was used to remove the cerumen impaction under microscopy. Condition: Stable. Patient tolerated procedure well. Complications: None.

## 2024-09-30 NOTE — DATA REVIEWED
[de-identified] : Audiogram personally reviewed and interpreted and my findings are as follows (9/13/24): Right: SRT 20dB; WRS 84%; Type A tymp; normal downsloping to severe SNHL Left: SRT 20dB; WRS 88%; Type Ad tymp; normal downsloping to severe SNHL [de-identified] : MRI brain wo contrast slides visually reviewed and personally interpreted as follows (9/16/24): no IAC lesion  MRI brain wo contrast read: no acute intracranial pathology. No acute infarct.

## 2024-09-30 NOTE — DATA REVIEWED
[de-identified] : Audiogram personally reviewed and interpreted and my findings are as follows (9/13/24): Right: SRT 20dB; WRS 84%; Type A tymp; normal downsloping to severe SNHL Left: SRT 20dB; WRS 88%; Type Ad tymp; normal downsloping to severe SNHL [de-identified] : MRI brain wo contrast slides visually reviewed and personally interpreted as follows (9/16/24): no IAC lesion  MRI brain wo contrast read: no acute intracranial pathology. No acute infarct.

## 2024-10-02 ENCOUNTER — APPOINTMENT (OUTPATIENT)
Dept: NEUROLOGY | Facility: CLINIC | Age: 64
End: 2024-10-02
Payer: COMMERCIAL

## 2024-10-02 ENCOUNTER — NON-APPOINTMENT (OUTPATIENT)
Age: 64
End: 2024-10-02

## 2024-10-02 VITALS
OXYGEN SATURATION: 96 % | HEIGHT: 67 IN | SYSTOLIC BLOOD PRESSURE: 156 MMHG | BODY MASS INDEX: 28.25 KG/M2 | WEIGHT: 180 LBS | TEMPERATURE: 97.3 F | DIASTOLIC BLOOD PRESSURE: 96 MMHG | HEART RATE: 69 BPM

## 2024-10-02 DIAGNOSIS — R06.83 SNORING: ICD-10-CM

## 2024-10-02 PROBLEM — G45.9 TRANSIENT CEREBRAL ISCHEMIC ATTACK: Status: ACTIVE | Noted: 2024-10-02

## 2024-10-02 LAB
ALBUMIN SERPL ELPH-MCNC: 4.8 G/DL
ALP BLD-CCNC: 68 U/L
ALT SERPL-CCNC: 20 U/L
ANION GAP SERPL CALC-SCNC: 11 MMOL/L
AST SERPL-CCNC: 18 U/L
BASOPHILS # BLD AUTO: 0.04 K/UL
BASOPHILS NFR BLD AUTO: 0.6 %
BILIRUB SERPL-MCNC: 0.5 MG/DL
BUN SERPL-MCNC: 16 MG/DL
CALCIUM SERPL-MCNC: 10 MG/DL
CHLORIDE SERPL-SCNC: 102 MMOL/L
CHOLEST SERPL-MCNC: 228 MG/DL
CO2 SERPL-SCNC: 29 MMOL/L
CREAT SERPL-MCNC: 0.84 MG/DL
EGFR: 97 ML/MIN/1.73M2
EOSINOPHIL # BLD AUTO: 0.07 K/UL
EOSINOPHIL NFR BLD AUTO: 1 %
ESTIMATED AVERAGE GLUCOSE: 117 MG/DL
GLUCOSE SERPL-MCNC: 88 MG/DL
HBA1C MFR BLD HPLC: 5.7 %
HCT VFR BLD CALC: 42 %
HDLC SERPL-MCNC: 51 MG/DL
HGB BLD-MCNC: 14.8 G/DL
IMM GRANULOCYTES NFR BLD AUTO: 0.4 %
LDLC SERPL CALC-MCNC: 148 MG/DL
LYMPHOCYTES # BLD AUTO: 2.06 K/UL
LYMPHOCYTES NFR BLD AUTO: 28.5 %
MAN DIFF?: NORMAL
MCHC RBC-ENTMCNC: 28.8 PG
MCHC RBC-ENTMCNC: 35.2 GM/DL
MCV RBC AUTO: 81.9 FL
MONOCYTES # BLD AUTO: 0.54 K/UL
MONOCYTES NFR BLD AUTO: 7.5 %
NEUTROPHILS # BLD AUTO: 4.49 K/UL
NEUTROPHILS NFR BLD AUTO: 62 %
NONHDLC SERPL-MCNC: 177 MG/DL
PLATELET # BLD AUTO: 244 K/UL
POTASSIUM SERPL-SCNC: 3.7 MMOL/L
PROT SERPL-MCNC: 6.8 G/DL
RBC # BLD: 5.13 M/UL
RBC # FLD: 12.2 %
SODIUM SERPL-SCNC: 143 MMOL/L
TRIGL SERPL-MCNC: 158 MG/DL
TSH SERPL-ACNC: 1.4 UIU/ML
WBC # FLD AUTO: 7.23 K/UL

## 2024-10-02 PROCEDURE — 99205 OFFICE O/P NEW HI 60 MIN: CPT

## 2024-10-02 RX ORDER — CIPROFLOXACIN AND DEXAMETHASONE 3; 1 MG/ML; MG/ML
0.3-0.1 SUSPENSION/ DROPS AURICULAR (OTIC) TWICE DAILY
Qty: 1 | Refills: 0 | Status: ACTIVE | COMMUNITY
Start: 2024-10-02 | End: 1900-01-01

## 2024-10-02 NOTE — ASSESSMENT
[FreeTextEntry1] : Graham Tan is a 64 year old male with PMH of HTN presented to [facility] ED on 9/14/24 for episode of transient LUE numbness, imbalance, dysarthria and blurred vision with imaging negative for acute pathology now presents for post ED follow up of possible TIA.  Plan: -Continue Aspirin 81 mg daily for secondary stroke prevention -TTE with bubble and 14 day Ziopatch to complete TIA work up -Continue aggressive vascular risk factor control with PCP, BP goal <130/80, LDL goal <70 -Stroke labs today including Lipoprotein A; pending results will discuss adding cholesterol medication as patient is reluctant at this time -HST to r/o CHANTEL -Counselled on healthy eating (DASH/Mediterranean diet, limiting red meats and fried foods) -Counselled on importance of regular exercise and remaining active -Counselled on f/u with PCP regarding regular health maintenance and prevention, including routine screening -Counselled on signs of stroke BEFAST and to call 911 with any new or worsening neurological symptoms -RTO in 3 months to review work up; will call with results as completed

## 2024-10-02 NOTE — HISTORY OF PRESENT ILLNESS
[FreeTextEntry1] : Graham Tan is a 64 year old male with PMH of HTN presented to [facility] ED on 9/14/24 for episode of transient LUE numbness, imbalance, dysarthria and blurred vision with imaging negative for acute pathology now presents for post ED follow up of possible TIA.  ED Imaging: HCT: Negative. CTA H/N: Negative. MRI Head: Negative. Discharge medications include DAPT x21 days then Aspirin monotherapy.  Since discharge, patient reports no new stroke-like symptoms. He is tolerating his Aspirin without bleeding or bruising. He reports only being able to tolerate taking his Plavix for 2 days due to GI upset. BP today 156/96. He recently started using a Nutridiet for low sodium and Mediterranean focused meals. He reports minimal exercise in the past 3 weeks due to fatigue. At baseline, he usually does aerobic exercise about 3-4 days per week and weights about 2 days per week. He endorses baseline issues with staying asleep and does endorse snoring. He has not completed a HST prior. He reports a family of Bell's palsy in his father and AF in his mother but endorses no blood clot history. He usually drinks alcohol socially and denies any smoking history.   He reports that a few days prior to presenting to the ED, he experienced dizziness with difficulty getting a few words out which he reports lasted for a few hours. He was seen by ENT since he thought it was vertigo but then 2 days later developed left finger numbness which has happened chronically, thought to be related to cervical degenerative spine disease.

## 2024-10-02 NOTE — PHYSICAL EXAM
[FreeTextEntry1] : Alert. Fully oriented. Speech and language are intact. Cranial nerves II-XII are intact. Motor exam reveals intact strength with individual muscle testing in bilateral upper and lower extremities. Sensation is intact to light touch in distal extremities. Finger-to-nose is intact. Rapid alternating movements are normal in the upper and lower extremities. Gait is normal.

## 2024-10-02 NOTE — HISTORY OF PRESENT ILLNESS
[Use of Plain Language] : use of plain language [FreeTextEntry1] : Graham Tan is a 64 year old male with PMH of HTN presented to [facility] ED on 9/14/24 for episode of transient LUE numbness, imbalance, dysarthria and blurred vision with imaging negative for acute pathology now presents for post ED follow up of possible TIA.  ED Imaging: HCT: Negative. CTA H/N: Negative. MRI Head: Negative. Discharge medications include DAPT x21 days then Aspirin monotherapy.  Since discharge, patient reports no new stroke-like symptoms. He is tolerating his Aspirin without bleeding or bruising. He reports only being able to tolerate taking his Plavix for 2 days due to GI upset. BP today 156/96. He recently started using a Nutridiet for low sodium and Mediterranean focused meals. He reports minimal exercise in the past 3 weeks due to fatigue. At baseline, he usually does aerobic exercise about 3-4 days per week and weights about 2 days per week. He endorses baseline issues with staying asleep and does endorse snoring. He has not completed a HST prior. He reports a family of Bell's palsy in his father and AF in his mother but endorses no blood clot history. He usually drinks alcohol socially and denies any smoking history.   He reports that a few days prior to presenting to the ED, he experienced dizziness with difficulty getting a few words out which he reports lasted for a few hours. He was seen by ENT since he thought it was vertigo but then 2 days later developed left finger numbness which has happened chronically, thought to be related to cervical degenerative spine disease.  [None] : none [Adequate] : adequate [] : I have reviewed management goals with caretaker and provided a copy of care plan

## 2024-10-02 NOTE — ASSESSMENT
[FreeTextEntry1] : Graham Tan is a 64 year old male with PMH of HTN presented to [facility] ED on 9/14/24 for episode of transient LUE numbness, imbalance, dysarthria and blurred vision with imaging negative for acute pathology now presents for post ED follow up of possible TIA.  Plan: -Continue Aspirin 81 mg daily for secondary stroke prevention -TTE with bubble and 14 day Ziopatch to complete TIA work up -Continue aggressive vascular risk factor control with PCP, BP goal <130/80, LDL goal <70 -Stroke labs today including Lipoprotein A; pending results will discuss adding cholesterol medication as patient is reluctant at this time -HST to r/o CHANTEL -Counselled on healthy eating (DASH/Mediterranean diet, limiting red meats and fried foods) -Counselled on importance of regular exercise and remaining active -Counselled on f/u with PCP regarding regular health maintenance and prevention, including routine screening -Counselled on signs of stroke BEFAST and to call 911 with any new or worsening neurological symptoms -RTO in 3 months to review work up; will call with results as completed

## 2024-10-07 ENCOUNTER — TRANSCRIPTION ENCOUNTER (OUTPATIENT)
Age: 64
End: 2024-10-07

## 2024-10-07 ENCOUNTER — NON-APPOINTMENT (OUTPATIENT)
Age: 64
End: 2024-10-07

## 2024-10-07 DIAGNOSIS — G45.9 TRANSIENT CEREBRAL ISCHEMIC ATTACK, UNSPECIFIED: ICD-10-CM

## 2024-10-07 LAB — APO LP(A) SERPL-MCNC: 19.6 NMOL/L

## 2024-10-07 RX ORDER — ATORVASTATIN CALCIUM 40 MG/1
40 TABLET, FILM COATED ORAL
Qty: 90 | Refills: 1 | Status: ACTIVE | COMMUNITY
Start: 2024-10-07 | End: 1900-01-01

## 2024-10-16 ENCOUNTER — APPOINTMENT (OUTPATIENT)
Dept: OTOLARYNGOLOGY | Facility: CLINIC | Age: 64
End: 2024-10-16

## 2024-10-23 ENCOUNTER — APPOINTMENT (OUTPATIENT)
Dept: SLEEP CENTER | Facility: HOME HEALTH | Age: 64
End: 2024-10-23

## 2024-10-23 PROCEDURE — 95800 SLP STDY UNATTENDED: CPT | Mod: 26

## 2024-11-06 ENCOUNTER — TRANSCRIPTION ENCOUNTER (OUTPATIENT)
Age: 64
End: 2024-11-06

## 2024-11-06 PROBLEM — G47.30 SLEEP APNEA IN ADULT: Status: ACTIVE | Noted: 2024-11-06

## 2024-11-12 ENCOUNTER — TRANSCRIPTION ENCOUNTER (OUTPATIENT)
Age: 64
End: 2024-11-12

## 2024-12-18 ENCOUNTER — RESULT REVIEW (OUTPATIENT)
Age: 64
End: 2024-12-18

## 2024-12-18 ENCOUNTER — OUTPATIENT (OUTPATIENT)
Dept: OUTPATIENT SERVICES | Facility: HOSPITAL | Age: 64
LOS: 1 days | End: 2024-12-18
Payer: COMMERCIAL

## 2024-12-18 PROCEDURE — 93306 TTE W/DOPPLER COMPLETE: CPT | Mod: 26

## 2024-12-18 PROCEDURE — 93306 TTE W/DOPPLER COMPLETE: CPT

## 2024-12-24 ENCOUNTER — RX RENEWAL (OUTPATIENT)
Age: 64
End: 2024-12-24

## 2024-12-27 RX ORDER — ATORVASTATIN CALCIUM 20 MG/1
20 TABLET, FILM COATED ORAL
Qty: 90 | Refills: 0 | Status: ACTIVE | COMMUNITY
Start: 2024-12-27 | End: 1900-01-01

## 2025-02-18 ENCOUNTER — RX RENEWAL (OUTPATIENT)
Age: 65
End: 2025-02-18

## 2025-03-24 ENCOUNTER — APPOINTMENT (OUTPATIENT)
Dept: OTOLARYNGOLOGY | Facility: CLINIC | Age: 65
End: 2025-03-24

## 2025-03-31 ENCOUNTER — EMERGENCY (EMERGENCY)
Facility: HOSPITAL | Age: 65
LOS: 1 days | Discharge: HOME | End: 2025-03-31
Attending: EMERGENCY MEDICINE | Admitting: EMERGENCY MEDICINE
Payer: COMMERCIAL

## 2025-03-31 VITALS
HEART RATE: 74 BPM | TEMPERATURE: 99 F | SYSTOLIC BLOOD PRESSURE: 143 MMHG | RESPIRATION RATE: 18 BRPM | DIASTOLIC BLOOD PRESSURE: 86 MMHG | OXYGEN SATURATION: 95 %

## 2025-03-31 VITALS
DIASTOLIC BLOOD PRESSURE: 99 MMHG | OXYGEN SATURATION: 93 % | HEIGHT: 67 IN | HEART RATE: 81 BPM | WEIGHT: 179.9 LBS | TEMPERATURE: 99 F | SYSTOLIC BLOOD PRESSURE: 162 MMHG | RESPIRATION RATE: 16 BRPM

## 2025-03-31 DIAGNOSIS — K57.92 DIVERTICULITIS OF INTESTINE, PART UNSPECIFIED, WITHOUT PERFORATION OR ABSCESS WITHOUT BLEEDING: ICD-10-CM

## 2025-03-31 DIAGNOSIS — R10.32 LEFT LOWER QUADRANT PAIN: ICD-10-CM

## 2025-03-31 LAB — LACTATE SERPL-SCNC: 0.8 MMOL/L — SIGNIFICANT CHANGE UP (ref 0.5–2)

## 2025-03-31 PROCEDURE — 74177 CT ABD & PELVIS W/CONTRAST: CPT | Mod: MC

## 2025-03-31 PROCEDURE — 80053 COMPREHEN METABOLIC PANEL: CPT

## 2025-03-31 PROCEDURE — 83605 ASSAY OF LACTIC ACID: CPT

## 2025-03-31 PROCEDURE — 74177 CT ABD & PELVIS W/CONTRAST: CPT | Mod: 26

## 2025-03-31 PROCEDURE — 96374 THER/PROPH/DIAG INJ IV PUSH: CPT | Mod: XU

## 2025-03-31 PROCEDURE — 99285 EMERGENCY DEPT VISIT HI MDM: CPT

## 2025-03-31 PROCEDURE — 99284 EMERGENCY DEPT VISIT MOD MDM: CPT | Mod: 25

## 2025-03-31 PROCEDURE — 85025 COMPLETE CBC W/AUTO DIFF WBC: CPT

## 2025-03-31 PROCEDURE — 36415 COLL VENOUS BLD VENIPUNCTURE: CPT

## 2025-03-31 PROCEDURE — 81003 URINALYSIS AUTO W/O SCOPE: CPT

## 2025-03-31 PROCEDURE — 96375 TX/PRO/DX INJ NEW DRUG ADDON: CPT

## 2025-03-31 RX ORDER — AMOXICILLIN AND CLAVULANATE POTASSIUM 500; 125 MG/1; MG/1
875 TABLET, FILM COATED ORAL
Qty: 28 | Refills: 0
Start: 2025-03-31 | End: 2025-04-13

## 2025-03-31 RX ORDER — CEFTRIAXONE 500 MG/1
1000 INJECTION, POWDER, FOR SOLUTION INTRAMUSCULAR; INTRAVENOUS ONCE
Refills: 0 | Status: COMPLETED | OUTPATIENT
Start: 2025-03-31 | End: 2025-03-31

## 2025-03-31 RX ORDER — METRONIDAZOLE 250 MG
500 TABLET ORAL ONCE
Refills: 0 | Status: COMPLETED | OUTPATIENT
Start: 2025-03-31 | End: 2025-03-31

## 2025-03-31 RX ORDER — IOHEXOL 350 MG/ML
30 INJECTION, SOLUTION INTRAVENOUS ONCE
Refills: 0 | Status: COMPLETED | OUTPATIENT
Start: 2025-03-31 | End: 2025-03-31

## 2025-03-31 RX ORDER — AMLODIPINE BESYLATE 10 MG/1
1 TABLET ORAL
Refills: 0 | DISCHARGE

## 2025-03-31 RX ORDER — HYDROCHLOROTHIAZIDE 50 MG/1
1 TABLET ORAL
Refills: 0 | DISCHARGE

## 2025-03-31 RX ORDER — ONDANSETRON HCL/PF 4 MG/2 ML
4 VIAL (ML) INJECTION ONCE
Refills: 0 | Status: ACTIVE | OUTPATIENT
Start: 2025-03-31

## 2025-03-31 RX ADMIN — Medication 100 MILLIGRAM(S): at 14:22

## 2025-03-31 RX ADMIN — CEFTRIAXONE 100 MILLIGRAM(S): 500 INJECTION, POWDER, FOR SOLUTION INTRAMUSCULAR; INTRAVENOUS at 14:04

## 2025-03-31 RX ADMIN — Medication 2000 MILLILITER(S): at 11:10

## 2025-03-31 RX ADMIN — IOHEXOL 30 MILLILITER(S): 350 INJECTION, SOLUTION INTRAVENOUS at 11:11

## 2025-03-31 NOTE — ED PROVIDER NOTE - PHYSICAL EXAMINATION
CONSTITUTIONAL: Well-appearing; well-nourished; in no apparent distress.   HEAD: Normocephalic; atraumatic.   EYES:  conjunctiva and sclera clear  ENT: normal nose; no rhinorrhea;  NECK: Supple; full ROM  RESPIRATORY: Breathing easily; no resp difficulty  GI: soft, tender suprapubic/LLQ. No RLQ tenderness, neg mcburneys, No CVA tenderness.   EXT: No cyanosis or edema;  SKIN: Normal for age and race; warm; dry; good turgor; no apparent lesions or rash.   NEURO: A & O x 3; face symmetric; grossly unremarkable.   PSYCHOLOGICAL: The patient’s mood and manner are appropriate.

## 2025-03-31 NOTE — ED PROVIDER NOTE - CLINICAL SUMMARY MEDICAL DECISION MAKING FREE TEXT BOX
here w/ symptoms of LLQ pain, found on CT imaging to have diverticulitis  s/p IV abx  counseled on diet and close follow up. has a GI he plans to see. report given to patient

## 2025-03-31 NOTE — ED ADULT NURSE NOTE - OBJECTIVE STATEMENT
Pt presents to the ED with complaints of left lower quadrant abdominal pain since 1am. Pt states he has a history of diverticulitis, states pain feels similar. On arrival, pt is alert and oriented, ambulatory, c/o 5/10 abdominal pain, last took tylenol 8am, denies chest pain, SOB, palpitations fever, nausea, or vomiting.   Pt changed into gown for examination.

## 2025-03-31 NOTE — ED PROVIDER NOTE - OBJECTIVE STATEMENT
65yo M hx of prior diveritulitis (never req operative management), here w/ complaints of left lower quadrant abdominal pain since 1am. Pt states he has a history of diverticulitis, states pain feels similar; 5/10 abdominal pain, last took tylenol 8am. He denies chest pain, SOB, palpitations fever, nausea, or vomiting.

## 2025-03-31 NOTE — ED PROVIDER NOTE - PATIENT PORTAL LINK FT
You can access the FollowMyHealth Patient Portal offered by [facility] by registering at the following website: [email or web address]. By joining [employer]’s FollowMyHealth portal, you will also be able to view your health information using other applications (apps) compatible with our system.

## 2025-03-31 NOTE — ED ADULT TRIAGE NOTE - CHIEF COMPLAINT QUOTE
Pt c/o LLQ pain x 1am today. Denies n/v/d, urinary symptoms, cp, sob. PMH HTN, TIA, diverticulitis, hernia surgery x2. No longer on eliquis.

## 2025-04-09 ENCOUNTER — APPOINTMENT (OUTPATIENT)
Dept: INTERNAL MEDICINE | Facility: CLINIC | Age: 65
End: 2025-04-09
Payer: COMMERCIAL

## 2025-04-09 VITALS
TEMPERATURE: 98 F | HEART RATE: 66 BPM | DIASTOLIC BLOOD PRESSURE: 88 MMHG | HEIGHT: 67 IN | OXYGEN SATURATION: 95 % | SYSTOLIC BLOOD PRESSURE: 134 MMHG | WEIGHT: 170 LBS | BODY MASS INDEX: 26.68 KG/M2

## 2025-04-09 DIAGNOSIS — Z91.89 OTHER SPECIFIED PERSONAL RISK FACTORS, NOT ELSEWHERE CLASSIFIED: ICD-10-CM

## 2025-04-09 DIAGNOSIS — I10 ESSENTIAL (PRIMARY) HYPERTENSION: ICD-10-CM

## 2025-04-09 DIAGNOSIS — K57.90 DIVERTICULOSIS OF INTESTINE, PART UNSPECIFIED, W/OUT PERFORATION OR ABSCESS W/OUT BLEEDING: ICD-10-CM

## 2025-04-09 DIAGNOSIS — G45.9 TRANSIENT CEREBRAL ISCHEMIC ATTACK, UNSPECIFIED: ICD-10-CM

## 2025-04-09 DIAGNOSIS — K21.9 GASTRO-ESOPHAGEAL REFLUX DISEASE W/OUT ESOPHAGITIS: ICD-10-CM

## 2025-04-09 PROBLEM — K57.92 DIVERTICULITIS OF INTESTINE, PART UNSPECIFIED, WITHOUT PERFORATION OR ABSCESS WITHOUT BLEEDING: Chronic | Status: ACTIVE | Noted: 2025-04-03

## 2025-04-09 PROCEDURE — 99396 PREV VISIT EST AGE 40-64: CPT | Mod: 25

## 2025-04-09 PROCEDURE — 99214 OFFICE O/P EST MOD 30 MIN: CPT | Mod: 25,GC

## 2025-04-09 PROCEDURE — 36415 COLL VENOUS BLD VENIPUNCTURE: CPT

## 2025-04-09 RX ORDER — ASPIRIN 81 MG/1
81 TABLET ORAL DAILY
Qty: 90 | Refills: 2 | Status: ACTIVE | COMMUNITY
Start: 2025-04-09 | End: 1900-01-01

## 2025-04-09 RX ORDER — ROSUVASTATIN CALCIUM 10 MG/1
10 TABLET, FILM COATED ORAL
Qty: 30 | Refills: 3 | Status: ACTIVE | COMMUNITY
Start: 2025-04-09 | End: 1900-01-01

## 2025-04-09 RX ORDER — CALCIUM CARBONATE/VITAMIN D3 600 MG-10
TABLET ORAL
Refills: 0 | Status: ACTIVE | COMMUNITY

## 2025-04-09 RX ORDER — OMEPRAZOLE 40 MG/1
40 CAPSULE, DELAYED RELEASE ORAL
Qty: 14 | Refills: 0 | Status: ACTIVE | COMMUNITY
Start: 2025-04-09 | End: 1900-01-01

## 2025-04-09 RX ORDER — CHLORTHALIDONE 25 MG/1
25 TABLET ORAL
Qty: 30 | Refills: 3 | Status: ACTIVE | COMMUNITY
Start: 2025-04-09 | End: 1900-01-01

## 2025-04-10 ENCOUNTER — NON-APPOINTMENT (OUTPATIENT)
Age: 65
End: 2025-04-10

## 2025-04-10 ENCOUNTER — TRANSCRIPTION ENCOUNTER (OUTPATIENT)
Age: 65
End: 2025-04-10

## 2025-04-10 LAB
ALBUMIN SERPL ELPH-MCNC: 4.6 G/DL
ALP BLD-CCNC: 61 U/L
ALT SERPL-CCNC: 18 U/L
ANION GAP SERPL CALC-SCNC: 10 MMOL/L
AST SERPL-CCNC: 19 U/L
BASOPHILS # BLD AUTO: 0.03 K/UL
BASOPHILS NFR BLD AUTO: 0.5 %
BILIRUB SERPL-MCNC: 0.4 MG/DL
BUN SERPL-MCNC: 13 MG/DL
CALCIUM SERPL-MCNC: 10.1 MG/DL
CHLORIDE SERPL-SCNC: 104 MMOL/L
CHOLEST SERPL-MCNC: 195 MG/DL
CO2 SERPL-SCNC: 30 MMOL/L
CREAT SERPL-MCNC: 0.92 MG/DL
EGFRCR SERPLBLD CKD-EPI 2021: 93 ML/MIN/1.73M2
EOSINOPHIL # BLD AUTO: 0.02 K/UL
EOSINOPHIL NFR BLD AUTO: 0.3 %
ESTIMATED AVERAGE GLUCOSE: 117 MG/DL
GLUCOSE SERPL-MCNC: 96 MG/DL
HBA1C MFR BLD HPLC: 5.7 %
HCT VFR BLD CALC: 42.1 %
HDLC SERPL-MCNC: 42 MG/DL
HGB BLD-MCNC: 14.7 G/DL
IMM GRANULOCYTES NFR BLD AUTO: 0.2 %
LDLC SERPL-MCNC: 135 MG/DL
LYMPHOCYTES # BLD AUTO: 1.45 K/UL
LYMPHOCYTES NFR BLD AUTO: 24.6 %
MAN DIFF?: NORMAL
MCHC RBC-ENTMCNC: 28.9 PG
MCHC RBC-ENTMCNC: 34.9 G/DL
MCV RBC AUTO: 82.7 FL
MONOCYTES # BLD AUTO: 0.42 K/UL
MONOCYTES NFR BLD AUTO: 7.1 %
NEUTROPHILS # BLD AUTO: 3.97 K/UL
NEUTROPHILS NFR BLD AUTO: 67.3 %
NONHDLC SERPL-MCNC: 153 MG/DL
PLATELET # BLD AUTO: 281 K/UL
POTASSIUM SERPL-SCNC: 3.8 MMOL/L
PROT SERPL-MCNC: 6.5 G/DL
RBC # BLD: 5.09 M/UL
RBC # FLD: 12.8 %
SODIUM SERPL-SCNC: 143 MMOL/L
TRIGL SERPL-MCNC: 99 MG/DL
WBC # FLD AUTO: 5.9 K/UL

## 2025-04-18 ENCOUNTER — APPOINTMENT (OUTPATIENT)
Dept: DERMATOLOGY | Facility: CLINIC | Age: 65
End: 2025-04-18
Payer: COMMERCIAL

## 2025-04-18 DIAGNOSIS — L82.1 OTHER SEBORRHEIC KERATOSIS: ICD-10-CM

## 2025-04-18 DIAGNOSIS — D22.9 MELANOCYTIC NEVI, UNSPECIFIED: ICD-10-CM

## 2025-04-18 DIAGNOSIS — Z12.83 ENCOUNTER FOR SCREENING FOR MALIGNANT NEOPLASM OF SKIN: ICD-10-CM

## 2025-04-18 PROCEDURE — 99203 OFFICE O/P NEW LOW 30 MIN: CPT

## 2025-04-23 ENCOUNTER — APPOINTMENT (OUTPATIENT)
Dept: INTERNAL MEDICINE | Facility: CLINIC | Age: 65
End: 2025-04-23
Payer: COMMERCIAL

## 2025-04-23 VITALS
BODY MASS INDEX: 26.68 KG/M2 | WEIGHT: 170 LBS | SYSTOLIC BLOOD PRESSURE: 135 MMHG | DIASTOLIC BLOOD PRESSURE: 79 MMHG | OXYGEN SATURATION: 96 % | TEMPERATURE: 98.3 F | HEIGHT: 67 IN | HEART RATE: 64 BPM

## 2025-04-23 DIAGNOSIS — G45.9 TRANSIENT CEREBRAL ISCHEMIC ATTACK, UNSPECIFIED: ICD-10-CM

## 2025-04-23 DIAGNOSIS — I10 ESSENTIAL (PRIMARY) HYPERTENSION: ICD-10-CM

## 2025-04-23 PROCEDURE — 99214 OFFICE O/P EST MOD 30 MIN: CPT | Mod: GC

## 2025-04-23 PROCEDURE — G2211 COMPLEX E/M VISIT ADD ON: CPT | Mod: NC

## 2025-05-14 ENCOUNTER — APPOINTMENT (OUTPATIENT)
Dept: GASTROENTEROLOGY | Facility: CLINIC | Age: 65
End: 2025-05-14

## 2025-05-14 VITALS
OXYGEN SATURATION: 99 % | SYSTOLIC BLOOD PRESSURE: 128 MMHG | HEIGHT: 67 IN | HEART RATE: 69 BPM | DIASTOLIC BLOOD PRESSURE: 80 MMHG | TEMPERATURE: 97.9 F | WEIGHT: 176 LBS | BODY MASS INDEX: 27.62 KG/M2 | RESPIRATION RATE: 15 BRPM

## 2025-05-14 PROCEDURE — 99205 OFFICE O/P NEW HI 60 MIN: CPT

## 2025-05-14 RX ORDER — SODIUM SULFATE, POTASSIUM SULFATE AND MAGNESIUM SULFATE 1.6; 3.13; 17.5 G/177ML; G/177ML; G/177ML
17.5-3.13-1.6 SOLUTION ORAL
Qty: 2 | Refills: 0 | Status: ACTIVE | COMMUNITY
Start: 2025-05-14 | End: 1900-01-01

## 2025-06-04 ENCOUNTER — APPOINTMENT (OUTPATIENT)
Dept: INTERNAL MEDICINE | Facility: CLINIC | Age: 65
End: 2025-06-04
Payer: COMMERCIAL

## 2025-06-04 PROCEDURE — 36415 COLL VENOUS BLD VENIPUNCTURE: CPT

## 2025-06-06 ENCOUNTER — NON-APPOINTMENT (OUTPATIENT)
Age: 65
End: 2025-06-06

## 2025-06-11 ENCOUNTER — APPOINTMENT (OUTPATIENT)
Dept: INTERNAL MEDICINE | Facility: CLINIC | Age: 65
End: 2025-06-11
Payer: COMMERCIAL

## 2025-06-11 VITALS
TEMPERATURE: 98.1 F | BODY MASS INDEX: 27.31 KG/M2 | SYSTOLIC BLOOD PRESSURE: 131 MMHG | HEART RATE: 60 BPM | WEIGHT: 174 LBS | DIASTOLIC BLOOD PRESSURE: 83 MMHG | OXYGEN SATURATION: 97 % | HEIGHT: 67 IN

## 2025-06-11 PROCEDURE — 99214 OFFICE O/P EST MOD 30 MIN: CPT | Mod: GC

## 2025-06-11 PROCEDURE — G2211 COMPLEX E/M VISIT ADD ON: CPT | Mod: NC

## 2025-06-11 RX ORDER — PRAVASTATIN SODIUM 10 MG/1
10 TABLET ORAL
Qty: 90 | Refills: 3 | Status: ACTIVE | COMMUNITY
Start: 2025-06-11 | End: 1900-01-01

## 2025-06-24 ENCOUNTER — APPOINTMENT (OUTPATIENT)
Dept: GASTROENTEROLOGY | Facility: HOSPITAL | Age: 65
End: 2025-06-24

## 2025-06-24 ENCOUNTER — OUTPATIENT (OUTPATIENT)
Dept: OUTPATIENT SERVICES | Facility: HOSPITAL | Age: 65
LOS: 1 days | Discharge: HOME | End: 2025-06-24
Payer: COMMERCIAL

## 2025-06-24 ENCOUNTER — RESULT REVIEW (OUTPATIENT)
Age: 65
End: 2025-06-24

## 2025-06-24 VITALS
HEIGHT: 67 IN | DIASTOLIC BLOOD PRESSURE: 84 MMHG | SYSTOLIC BLOOD PRESSURE: 137 MMHG | OXYGEN SATURATION: 94 % | RESPIRATION RATE: 16 BRPM | HEART RATE: 65 BPM | WEIGHT: 169.09 LBS

## 2025-06-24 PROCEDURE — 88305 TISSUE EXAM BY PATHOLOGIST: CPT

## 2025-06-24 PROCEDURE — 45381 COLONOSCOPY SUBMUCOUS NJX: CPT

## 2025-06-24 PROCEDURE — 45385 COLONOSCOPY W/LESION REMOVAL: CPT

## 2025-06-24 PROCEDURE — 43235 EGD DIAGNOSTIC BRUSH WASH: CPT

## 2025-06-24 PROCEDURE — 88305 TISSUE EXAM BY PATHOLOGIST: CPT | Mod: 26

## 2025-06-24 PROCEDURE — 45390 COLONOSCOPY W/RESECTION: CPT

## 2025-06-24 PROCEDURE — 43239 EGD BIOPSY SINGLE/MULTIPLE: CPT

## 2025-06-26 ENCOUNTER — APPOINTMENT (OUTPATIENT)
Dept: GASTROENTEROLOGY | Facility: CLINIC | Age: 65
End: 2025-06-26

## 2025-06-26 ENCOUNTER — NON-APPOINTMENT (OUTPATIENT)
Age: 65
End: 2025-06-26

## 2025-06-26 PROCEDURE — 99214 OFFICE O/P EST MOD 30 MIN: CPT | Mod: 3W

## 2025-07-16 ENCOUNTER — APPOINTMENT (OUTPATIENT)
Dept: HEART AND VASCULAR | Facility: CLINIC | Age: 65
End: 2025-07-16
Payer: COMMERCIAL

## 2025-07-16 VITALS
TEMPERATURE: 99.2 F | DIASTOLIC BLOOD PRESSURE: 76 MMHG | HEIGHT: 67 IN | BODY MASS INDEX: 27.31 KG/M2 | WEIGHT: 173.99 LBS | OXYGEN SATURATION: 97 % | SYSTOLIC BLOOD PRESSURE: 120 MMHG | HEART RATE: 62 BPM

## 2025-07-16 PROBLEM — H61.23 BILATERAL IMPACTED CERUMEN: Status: RESOLVED | Noted: 2024-09-13 | Resolved: 2025-07-16

## 2025-07-16 PROBLEM — Z01.00 EYE EXAM, ROUTINE: Status: RESOLVED | Noted: 2022-08-17 | Resolved: 2025-07-16

## 2025-07-16 PROBLEM — Z12.83 SCREENING EXAM FOR SKIN CANCER: Status: RESOLVED | Noted: 2025-04-18 | Resolved: 2025-07-16

## 2025-07-16 PROBLEM — Z11.59 NEED FOR HEPATITIS C SCREENING TEST: Status: RESOLVED | Noted: 2023-03-16 | Resolved: 2025-07-16

## 2025-07-16 PROBLEM — Z12.5 PROSTATE CANCER SCREENING: Status: RESOLVED | Noted: 2024-04-03 | Resolved: 2025-07-16

## 2025-07-16 PROBLEM — Z78.9 DOES NOT USE ILLICIT DRUGS: Status: ACTIVE | Noted: 2025-07-16

## 2025-07-16 PROBLEM — Z11.4 ENCOUNTER FOR SCREENING FOR HIV: Status: RESOLVED | Noted: 2023-03-16 | Resolved: 2025-07-16

## 2025-07-16 PROCEDURE — G2211 COMPLEX E/M VISIT ADD ON: CPT | Mod: NC

## 2025-07-16 PROCEDURE — 99203 OFFICE O/P NEW LOW 30 MIN: CPT

## 2025-07-16 PROCEDURE — 93000 ELECTROCARDIOGRAM COMPLETE: CPT

## 2025-07-30 ENCOUNTER — APPOINTMENT (OUTPATIENT)
Dept: HEART AND VASCULAR | Facility: CLINIC | Age: 65
End: 2025-07-30
Payer: COMMERCIAL

## 2025-07-30 PROCEDURE — 93015 CV STRESS TEST SUPVJ I&R: CPT

## 2025-07-31 ENCOUNTER — APPOINTMENT (OUTPATIENT)
Dept: GASTROENTEROLOGY | Facility: HOSPITAL | Age: 65
End: 2025-07-31

## 2025-08-08 ENCOUNTER — APPOINTMENT (OUTPATIENT)
Dept: HEART AND VASCULAR | Facility: CLINIC | Age: 65
End: 2025-08-08
Payer: COMMERCIAL

## 2025-08-08 PROCEDURE — 93242 EXT ECG>48HR<7D RECORDING: CPT

## 2025-08-11 ENCOUNTER — EMERGENCY (EMERGENCY)
Facility: HOSPITAL | Age: 65
LOS: 1 days | End: 2025-08-11
Attending: EMERGENCY MEDICINE | Admitting: EMERGENCY MEDICINE
Payer: SELF-PAY

## 2025-08-11 VITALS
OXYGEN SATURATION: 94 % | TEMPERATURE: 98 F | SYSTOLIC BLOOD PRESSURE: 171 MMHG | WEIGHT: 169.98 LBS | HEIGHT: 67 IN | DIASTOLIC BLOOD PRESSURE: 95 MMHG | HEART RATE: 54 BPM | RESPIRATION RATE: 18 BRPM

## 2025-08-11 VITALS
DIASTOLIC BLOOD PRESSURE: 72 MMHG | RESPIRATION RATE: 16 BRPM | OXYGEN SATURATION: 95 % | HEART RATE: 59 BPM | SYSTOLIC BLOOD PRESSURE: 134 MMHG

## 2025-08-11 LAB
ANION GAP SERPL CALC-SCNC: 13 MMOL/L — SIGNIFICANT CHANGE UP (ref 5–17)
BASOPHILS # BLD AUTO: 0.04 K/UL — SIGNIFICANT CHANGE UP (ref 0–0.2)
BASOPHILS NFR BLD AUTO: 0.6 % — SIGNIFICANT CHANGE UP (ref 0–2)
BUN SERPL-MCNC: 20 MG/DL — SIGNIFICANT CHANGE UP (ref 7–23)
CALCIUM SERPL-MCNC: 9.9 MG/DL — SIGNIFICANT CHANGE UP (ref 8.4–10.5)
CHLORIDE SERPL-SCNC: 101 MMOL/L — SIGNIFICANT CHANGE UP (ref 96–108)
CO2 SERPL-SCNC: 26 MMOL/L — SIGNIFICANT CHANGE UP (ref 22–31)
CREAT SERPL-MCNC: 0.85 MG/DL — SIGNIFICANT CHANGE UP (ref 0.5–1.3)
EGFR: 97 ML/MIN/1.73M2 — SIGNIFICANT CHANGE UP
EGFR: 97 ML/MIN/1.73M2 — SIGNIFICANT CHANGE UP
EOSINOPHIL # BLD AUTO: 0.04 K/UL — SIGNIFICANT CHANGE UP (ref 0–0.5)
EOSINOPHIL NFR BLD AUTO: 0.6 % — SIGNIFICANT CHANGE UP (ref 0–6)
GLUCOSE SERPL-MCNC: 91 MG/DL — SIGNIFICANT CHANGE UP (ref 70–99)
HCT VFR BLD CALC: 44.3 % — SIGNIFICANT CHANGE UP (ref 39–50)
HGB BLD-MCNC: 15.3 G/DL — SIGNIFICANT CHANGE UP (ref 13–17)
IMM GRANULOCYTES # BLD AUTO: 0.02 K/UL — SIGNIFICANT CHANGE UP (ref 0–0.07)
IMM GRANULOCYTES NFR BLD AUTO: 0.3 % — SIGNIFICANT CHANGE UP (ref 0–0.9)
LYMPHOCYTES # BLD AUTO: 1.62 K/UL — SIGNIFICANT CHANGE UP (ref 1–3.3)
LYMPHOCYTES NFR BLD AUTO: 23.4 % — SIGNIFICANT CHANGE UP (ref 13–44)
MAGNESIUM SERPL-MCNC: 1.9 MG/DL — SIGNIFICANT CHANGE UP (ref 1.6–2.6)
MCHC RBC-ENTMCNC: 29.5 PG — SIGNIFICANT CHANGE UP (ref 27–34)
MCHC RBC-ENTMCNC: 34.5 G/DL — SIGNIFICANT CHANGE UP (ref 32–36)
MCV RBC AUTO: 85.5 FL — SIGNIFICANT CHANGE UP (ref 80–100)
MONOCYTES # BLD AUTO: 0.43 K/UL — SIGNIFICANT CHANGE UP (ref 0–0.9)
MONOCYTES NFR BLD AUTO: 6.2 % — SIGNIFICANT CHANGE UP (ref 2–14)
NEUTROPHILS # BLD AUTO: 4.76 K/UL — SIGNIFICANT CHANGE UP (ref 1.8–7.4)
NEUTROPHILS NFR BLD AUTO: 68.9 % — SIGNIFICANT CHANGE UP (ref 43–77)
NRBC # BLD AUTO: 0 K/UL — SIGNIFICANT CHANGE UP (ref 0–0)
NRBC # FLD: 0 K/UL — SIGNIFICANT CHANGE UP (ref 0–0)
NRBC BLD AUTO-RTO: 0 /100 WBCS — SIGNIFICANT CHANGE UP (ref 0–0)
PLATELET # BLD AUTO: 230 K/UL — SIGNIFICANT CHANGE UP (ref 150–400)
PMV BLD: 9.2 FL — SIGNIFICANT CHANGE UP (ref 7–13)
POTASSIUM SERPL-MCNC: 3.3 MMOL/L — LOW (ref 3.5–5.3)
POTASSIUM SERPL-SCNC: 3.3 MMOL/L — LOW (ref 3.5–5.3)
RBC # BLD: 5.18 M/UL — SIGNIFICANT CHANGE UP (ref 4.2–5.8)
RBC # FLD: 12.5 % — SIGNIFICANT CHANGE UP (ref 10.3–14.5)
SODIUM SERPL-SCNC: 140 MMOL/L — SIGNIFICANT CHANGE UP (ref 135–145)
TSH SERPL-MCNC: 1.94 UIU/ML — SIGNIFICANT CHANGE UP (ref 0.27–4.2)
WBC # BLD: 6.91 K/UL — SIGNIFICANT CHANGE UP (ref 3.8–10.5)
WBC # FLD AUTO: 6.91 K/UL — SIGNIFICANT CHANGE UP (ref 3.8–10.5)

## 2025-08-11 PROCEDURE — 83735 ASSAY OF MAGNESIUM: CPT

## 2025-08-11 PROCEDURE — 85025 COMPLETE CBC W/AUTO DIFF WBC: CPT

## 2025-08-11 PROCEDURE — 84443 ASSAY THYROID STIM HORMONE: CPT

## 2025-08-11 PROCEDURE — 99284 EMERGENCY DEPT VISIT MOD MDM: CPT

## 2025-08-11 PROCEDURE — 80048 BASIC METABOLIC PNL TOTAL CA: CPT

## 2025-08-11 PROCEDURE — 99283 EMERGENCY DEPT VISIT LOW MDM: CPT

## 2025-08-11 PROCEDURE — 36415 COLL VENOUS BLD VENIPUNCTURE: CPT

## 2025-08-11 RX ADMIN — Medication 1000 MILLILITER(S): at 16:29

## 2025-08-11 RX ADMIN — Medication 40 MILLIEQUIVALENT(S): at 17:17

## 2025-08-13 ENCOUNTER — APPOINTMENT (OUTPATIENT)
Dept: HEART AND VASCULAR | Facility: CLINIC | Age: 65
End: 2025-08-13

## 2025-08-14 DIAGNOSIS — R53.1 WEAKNESS: ICD-10-CM

## 2025-08-14 DIAGNOSIS — E87.6 HYPOKALEMIA: ICD-10-CM

## 2025-08-14 DIAGNOSIS — E78.5 HYPERLIPIDEMIA, UNSPECIFIED: ICD-10-CM

## 2025-08-14 DIAGNOSIS — I10 ESSENTIAL (PRIMARY) HYPERTENSION: ICD-10-CM

## 2025-08-14 DIAGNOSIS — Z88.8 ALLERGY STATUS TO OTHER DRUGS, MEDICAMENTS AND BIOLOGICAL SUBSTANCES: ICD-10-CM

## 2025-08-14 DIAGNOSIS — Z86.73 PERSONAL HISTORY OF TRANSIENT ISCHEMIC ATTACK (TIA), AND CEREBRAL INFARCTION WITHOUT RESIDUAL DEFICITS: ICD-10-CM

## 2025-08-14 DIAGNOSIS — R53.83 OTHER FATIGUE: ICD-10-CM

## (undated) DEVICE — Device

## (undated) DEVICE — LIFTER SYR EVERLIFT AGENT SUBMUCOSAL 10ML BLU

## (undated) DEVICE — SNARE LESIONHUNTER ROTAT NITNL COLD 15MM

## (undated) DEVICE — FORCEP RADIAL JAW 4 W NDL 2.2MM 2.8MM 240CM ORANGE DISP